# Patient Record
Sex: FEMALE | Race: BLACK OR AFRICAN AMERICAN | Employment: PART TIME | URBAN - METROPOLITAN AREA
[De-identification: names, ages, dates, MRNs, and addresses within clinical notes are randomized per-mention and may not be internally consistent; named-entity substitution may affect disease eponyms.]

---

## 2021-04-26 ENCOUNTER — APPOINTMENT (OUTPATIENT)
Dept: GENERAL RADIOLOGY | Age: 41
End: 2021-04-26
Payer: MEDICAID

## 2021-04-26 ENCOUNTER — HOSPITAL ENCOUNTER (EMERGENCY)
Age: 41
Discharge: HOME | End: 2021-04-26
Attending: EMERGENCY MEDICINE
Payer: MEDICAID

## 2021-04-26 VITALS
TEMPERATURE: 98.1 F | HEART RATE: 74 BPM | OXYGEN SATURATION: 98 % | RESPIRATION RATE: 18 BRPM | DIASTOLIC BLOOD PRESSURE: 87 MMHG | SYSTOLIC BLOOD PRESSURE: 131 MMHG

## 2021-04-26 DIAGNOSIS — U07.1 COVID-19: Primary | ICD-10-CM

## 2021-04-26 DIAGNOSIS — F17.200 CURRENT SMOKER: ICD-10-CM

## 2021-04-26 DIAGNOSIS — D64.9 ANEMIA, UNSPECIFIED TYPE: ICD-10-CM

## 2021-04-26 LAB
A/G RATIO: 1 (ref 1.1–2.2)
ALBUMIN SERPL-MCNC: 4.1 G/DL (ref 3.4–5)
ALP BLD-CCNC: 103 U/L (ref 40–129)
ALT SERPL-CCNC: 12 U/L (ref 10–40)
ANION GAP SERPL CALCULATED.3IONS-SCNC: 14 MMOL/L (ref 3–16)
ANISOCYTOSIS: ABNORMAL
AST SERPL-CCNC: 18 U/L (ref 15–37)
BASOPHILS ABSOLUTE: 0 K/UL (ref 0–0.2)
BASOPHILS RELATIVE PERCENT: 0.7 %
BILIRUB SERPL-MCNC: <0.2 MG/DL (ref 0–1)
BUN BLDV-MCNC: 7 MG/DL (ref 7–20)
CALCIUM SERPL-MCNC: 8.8 MG/DL (ref 8.3–10.6)
CHLORIDE BLD-SCNC: 103 MMOL/L (ref 99–110)
CO2: 22 MMOL/L (ref 21–32)
CREAT SERPL-MCNC: 0.7 MG/DL (ref 0.6–1.1)
EOSINOPHILS ABSOLUTE: 0 K/UL (ref 0–0.6)
EOSINOPHILS RELATIVE PERCENT: 0.9 %
GFR AFRICAN AMERICAN: >60
GFR NON-AFRICAN AMERICAN: >60
GLOBULIN: 4.2 G/DL
GLUCOSE BLD-MCNC: 102 MG/DL (ref 70–99)
HCG QUALITATIVE: NEGATIVE
HCT VFR BLD CALC: 25.9 % (ref 36–48)
HEMATOLOGY PATH CONSULT: YES
HEMOGLOBIN: 7.9 G/DL (ref 12–16)
HYPOCHROMIA: ABNORMAL
IRON SATURATION: 3 % (ref 15–50)
IRON: 13 UG/DL (ref 37–145)
LYMPHOCYTES ABSOLUTE: 1.2 K/UL (ref 1–5.1)
LYMPHOCYTES RELATIVE PERCENT: 34.6 %
MAGNESIUM: 1.8 MG/DL (ref 1.8–2.4)
MCH RBC QN AUTO: 19.1 PG (ref 26–34)
MCHC RBC AUTO-ENTMCNC: 30.4 G/DL (ref 31–36)
MCV RBC AUTO: 62.7 FL (ref 80–100)
MICROCYTES: ABNORMAL
MONOCYTES ABSOLUTE: 0.4 K/UL (ref 0–1.3)
MONOCYTES RELATIVE PERCENT: 10.5 %
NEUTROPHILS ABSOLUTE: 1.9 K/UL (ref 1.7–7.7)
NEUTROPHILS RELATIVE PERCENT: 53.3 %
OVALOCYTES: ABNORMAL
PDW BLD-RTO: 17.8 % (ref 12.4–15.4)
PLATELET # BLD: 270 K/UL (ref 135–450)
PLATELET SLIDE REVIEW: ADEQUATE
PMV BLD AUTO: 8.5 FL (ref 5–10.5)
POLYCHROMASIA: ABNORMAL
POTASSIUM REFLEX MAGNESIUM: 3.2 MMOL/L (ref 3.5–5.1)
RBC # BLD: 4.13 M/UL (ref 4–5.2)
SARS-COV-2, NAAT: DETECTED
SODIUM BLD-SCNC: 139 MMOL/L (ref 136–145)
TARGET CELLS: ABNORMAL
TOTAL IRON BINDING CAPACITY: 402 UG/DL (ref 260–445)
TOTAL PROTEIN: 8.3 G/DL (ref 6.4–8.2)
TROPONIN: <0.01 NG/ML
WBC # BLD: 3.5 K/UL (ref 4–11)

## 2021-04-26 PROCEDURE — 2500000003 HC RX 250 WO HCPCS: Performed by: EMERGENCY MEDICINE

## 2021-04-26 PROCEDURE — 99285 EMERGENCY DEPT VISIT HI MDM: CPT

## 2021-04-26 PROCEDURE — 96375 TX/PRO/DX INJ NEW DRUG ADDON: CPT

## 2021-04-26 PROCEDURE — 2580000003 HC RX 258: Performed by: EMERGENCY MEDICINE

## 2021-04-26 PROCEDURE — 93005 ELECTROCARDIOGRAM TRACING: CPT | Performed by: EMERGENCY MEDICINE

## 2021-04-26 PROCEDURE — 87635 SARS-COV-2 COVID-19 AMP PRB: CPT

## 2021-04-26 PROCEDURE — 83550 IRON BINDING TEST: CPT

## 2021-04-26 PROCEDURE — 96365 THER/PROPH/DIAG IV INF INIT: CPT

## 2021-04-26 PROCEDURE — 6370000000 HC RX 637 (ALT 250 FOR IP): Performed by: EMERGENCY MEDICINE

## 2021-04-26 PROCEDURE — 84703 CHORIONIC GONADOTROPIN ASSAY: CPT

## 2021-04-26 PROCEDURE — 80053 COMPREHEN METABOLIC PANEL: CPT

## 2021-04-26 PROCEDURE — 83540 ASSAY OF IRON: CPT

## 2021-04-26 PROCEDURE — 6360000002 HC RX W HCPCS: Performed by: EMERGENCY MEDICINE

## 2021-04-26 PROCEDURE — 85025 COMPLETE CBC W/AUTO DIFF WBC: CPT

## 2021-04-26 PROCEDURE — 84484 ASSAY OF TROPONIN QUANT: CPT

## 2021-04-26 PROCEDURE — 83735 ASSAY OF MAGNESIUM: CPT

## 2021-04-26 PROCEDURE — 71045 X-RAY EXAM CHEST 1 VIEW: CPT

## 2021-04-26 RX ORDER — ONDANSETRON 2 MG/ML
8 INJECTION INTRAMUSCULAR; INTRAVENOUS ONCE
Status: COMPLETED | OUTPATIENT
Start: 2021-04-26 | End: 2021-04-26

## 2021-04-26 RX ORDER — FERROUS SULFATE 300 MG/5ML
300 LIQUID (ML) ORAL DAILY
Qty: 300 ML | Refills: 3 | Status: SHIPPED | OUTPATIENT
Start: 2021-04-26

## 2021-04-26 RX ORDER — ACETAMINOPHEN 325 MG/1
650 TABLET ORAL ONCE
Status: COMPLETED | OUTPATIENT
Start: 2021-04-26 | End: 2021-04-26

## 2021-04-26 RX ORDER — 0.9 % SODIUM CHLORIDE 0.9 %
1000 INTRAVENOUS SOLUTION INTRAVENOUS ONCE
Status: COMPLETED | OUTPATIENT
Start: 2021-04-26 | End: 2021-04-26

## 2021-04-26 RX ORDER — POTASSIUM CHLORIDE 750 MG/1
40 TABLET, FILM COATED, EXTENDED RELEASE ORAL ONCE
Status: DISCONTINUED | OUTPATIENT
Start: 2021-04-26 | End: 2021-04-26

## 2021-04-26 RX ORDER — KETOROLAC TROMETHAMINE 30 MG/ML
15 INJECTION, SOLUTION INTRAMUSCULAR; INTRAVENOUS ONCE
Status: COMPLETED | OUTPATIENT
Start: 2021-04-26 | End: 2021-04-26

## 2021-04-26 RX ADMIN — ACETAMINOPHEN 650 MG: 325 TABLET ORAL at 21:31

## 2021-04-26 RX ADMIN — IMDEVIMAB: 1332 INJECTION, SOLUTION, CONCENTRATE INTRAVENOUS at 18:31

## 2021-04-26 RX ADMIN — SODIUM CHLORIDE 1000 ML: 9 INJECTION, SOLUTION INTRAVENOUS at 16:27

## 2021-04-26 RX ADMIN — POTASSIUM BICARBONATE 40 MEQ: 782 TABLET, EFFERVESCENT ORAL at 18:48

## 2021-04-26 RX ADMIN — ONDANSETRON 8 MG: 2 INJECTION INTRAMUSCULAR; INTRAVENOUS at 16:13

## 2021-04-26 RX ADMIN — KETOROLAC TROMETHAMINE 15 MG: 30 INJECTION, SOLUTION INTRAMUSCULAR at 16:26

## 2021-04-26 ASSESSMENT — PAIN DESCRIPTION - PAIN TYPE
TYPE: ACUTE PAIN
TYPE: ACUTE PAIN

## 2021-04-26 ASSESSMENT — ENCOUNTER SYMPTOMS
CONSTIPATION: 0
WHEEZING: 0
SHORTNESS OF BREATH: 1
COUGH: 1
EYE PAIN: 0

## 2021-04-26 ASSESSMENT — PAIN SCALES - GENERAL
PAINLEVEL_OUTOF10: 10
PAINLEVEL_OUTOF10: 10
PAINLEVEL_OUTOF10: 7

## 2021-04-26 ASSESSMENT — PAIN DESCRIPTION - PROGRESSION: CLINICAL_PROGRESSION: GRADUALLY WORSENING

## 2021-04-26 ASSESSMENT — PAIN DESCRIPTION - DESCRIPTORS
DESCRIPTORS: BURNING
DESCRIPTORS: SHARP

## 2021-04-26 ASSESSMENT — PAIN DESCRIPTION - LOCATION
LOCATION: HEAD
LOCATION: CHEST

## 2021-04-26 ASSESSMENT — PAIN DESCRIPTION - ORIENTATION: ORIENTATION: MID

## 2021-04-26 ASSESSMENT — PAIN DESCRIPTION - FREQUENCY: FREQUENCY: CONTINUOUS

## 2021-04-26 NOTE — ED PROVIDER NOTES
[address] [facility]      Pt Name: Lorri Kim  MRN: [id number]  [place] 1980  Date of evaluation: 2021  Provider: Regulo Whitten MD    CHIEF COMPLAINT       Chief Complaint   Patient presents with    Shortness of Breath     worsening x1 week. +anxiety. HISTORY OF PRESENT ILLNESS  (Location/Symptom, Timing/Onset,Context/Setting, Quality, Duration, Modifying Factors, Severity). Note limiting factors. Lorri Kim is a 47 y.o. female who presents to the emergency department secondary to concern for SOB that has been intermittent for the last week and a half. She reports she has been coughing (dry cough). Has been taking beatris seltzer cold, tylenol, cough drops, ibuprofen. She states she would feel better after taking it but then the symptoms would come back and she would feel worse. States she has not been eating or drinking well. She has not had the covid vaccines or covid. She works at a [employer] and one of her coworkers had covid a week ago. Past medical history noted below, significant for anxiety and asthma. She currently smokes 2 black and milds/day. Aside from what is stated above denies any other symptoms or modifying factors. Nursing Notes reviewed. REVIEW OF SYSTEMS  (2-9 systems for level 4, 10 or more for level 5)   Review of Systems   Constitutional: Positive for appetite change. Negative for fever. HENT: Negative for congestion. Eyes: Negative for pain. Respiratory: Positive for cough and shortness of breath. Negative for wheezing. Cardiovascular: Negative for leg swelling. Gastrointestinal: Negative for constipation. Musculoskeletal: Negative for gait problem. Skin: Negative for rash.        PAST MEDICAL HISTORY     Past Medical History:   Diagnosis Date    Anxiety     Asthma        SURGICALHISTORY       Past Surgical History:   Procedure Laterality Date     SECTION CURRENT MEDICATIONS       Previous Medications    ALBUTEROL (PROVENTIL HFA;VENTOLIN HFA) 108 (90 BASE) MCG/ACT INHALER    Inhale 2 puffs into the lungs every 6 hours as needed for Wheezing. ALBUTEROL SULFATE HFA (PROVENTIL HFA) 108 (90 BASE) MCG/ACT INHALER    Inhale 2 puffs into the lungs every 4 hours as needed for Wheezing or Shortness of Breath (Space out to every 6 hours as symptoms improve) Space out to every 6 hours as symptoms improve. IBUPROFEN (ADVIL;MOTRIN) 600 MG TABLET    Take 1 tablet by mouth three times daily    SERTRALINE HCL (ZOLOFT PO)    Take by mouth      ALLERGIES     Pcn [penicillins]  FAMILY HISTORY     History reviewed. No pertinent family history.   SOCIAL HISTORY       Social History     Socioeconomic History    Marital status: Single     Spouse name: None    Number of children: None    Years of education: None    Highest education level: None   Occupational History    None   Social Needs    Financial resource strain: None    Food insecurity     Worry: None     Inability: None    Transportation needs     Medical: None     Non-medical: None   Tobacco Use    Smoking status: Current Every Day Smoker     Packs/day: 3.00     Types: Cigars    Smokeless tobacco: Never Used    Tobacco comment: 2 black and milds a day   Substance and Sexual Activity    Alcohol use: No    Drug use: Never    Sexual activity: Not Currently   Lifestyle    Physical activity     Days per week: None     Minutes per session: None    Stress: None   Relationships    Social connections     Talks on phone: None     Gets together: None     Attends Mosque service: None     Active member of club or organization: None     Attends meetings of clubs or organizations: None     Relationship status: None    Intimate partner violence     Fear of current or ex partner: None     Emotionally abused: None     Physically abused: None     Forced sexual activity: None   Other Topics Concern    None   Social History Narrative    None     SCREENINGS         PHYSICAL EXAM  (up to 7 for level 4, 8 or more for level 5)   INITIAL VITALS: BP: 132/86, Temp: 98.1 °F (36.7 °C), Pulse: 99, Resp: 20, SpO2: 99 %   Physical Exam  Vitals signs reviewed. Constitutional:       General: She is not in acute distress. Appearance: She is not ill-appearing or toxic-appearing. HENT:      Head: Normocephalic and atraumatic. Right Ear: External ear normal.      Left Ear: External ear normal.   Eyes:      General: No scleral icterus. Extraocular Movements: Extraocular movements intact. Conjunctiva/sclera: Conjunctivae normal.      Pupils: Pupils are equal, round, and reactive to light. Neck:      Musculoskeletal: Normal range of motion. Trachea: No tracheal deviation. Cardiovascular:      Rate and Rhythm: Tachycardia present. Comments: Intermittently mildly tachycardic with a rate in the 100s  Pulmonary:      Effort: Pulmonary effort is normal. No respiratory distress. Breath sounds: No decreased breath sounds, wheezing or rhonchi. Musculoskeletal:      Right lower leg: She exhibits no tenderness. No edema. Left lower leg: She exhibits no tenderness. No edema. Skin:     General: Skin is warm and dry. Capillary Refill: Capillary refill takes 2 to 3 seconds. Neurological:      General: No focal deficit present. Mental Status: She is alert and oriented to person, place, and time. Psychiatric:         Mood and Affect: Mood normal.         Behavior: Behavior normal.       DIAGNOSTIC RESULTS   EKG: All EKG's are interpreted by the Emergency Department Physician who either signs or Co-signs this chart in the absence of a cardiologist.  Indication: sob  Interpretation: Rate 96, rhythm sinus, axis normal.  OK/QRS/QTc within normal limits. No T/ST changes consistent with acute ischemia. Comparison to prior EKG from March 23, 2016 shows no acute ischemic changes noted.     RADIOLOGY: Interpretation per Radiologist below, if available at the time of this note:  XR CHEST PORTABLE   Final Result   No active cardiopulmonary disease           LABS:  Labs Reviewed   COVID-19, RAPID - Abnormal; Notable for the following components:       Result Value    SARS-CoV-2, NAAT DETECTED (*)     All other components within normal limits    Narrative:     Performed at:  [address] [employer]   Phone [phone]   COMPREHENSIVE METABOLIC PANEL W/ REFLEX TO MG FOR LOW K - Abnormal; Notable for the following components:    Potassium reflex Magnesium 3.2 (*)     Glucose 102 (*)     Total Protein 8.3 (*)     Albumin/Globulin Ratio 1.0 (*)     All other components within normal limits    Narrative:     Performed at:  [address] [employer]   Phone [phone]   CBC WITH AUTO DIFFERENTIAL - Abnormal; Notable for the following components:    WBC 3.5 (*)     Hemoglobin 7.9 (*)     Hematocrit 25.9 (*)     MCV 62.7 (*)     MCH 19.1 (*)     MCHC 30.4 (*)     RDW 17.8 (*)     Anisocytosis Occasional (*)     Microcytes 2+ (*)     Polychromasia Occasional (*)     Hypochromia 1+ (*)     Ovalocytes Occasional (*)     Target Cells 1+ (*)     All other components within normal limits    Narrative:     Performed at:  [address] [employer]   Phone [phone]   IRON AND TIBC - Abnormal; Notable for the following components:    Iron 13 (*)     Iron Saturation 3 (*)     All other components within normal limits    Narrative:     Performed at:  [address] [facility] [employer]   Phone [phone]   TROPONIN    Narrative:     Performed at:  [address] [facility] [employer]   Phone [phone]   HCG, SERUM, QUALITATIVE Narrative:     Performed at:  [facility]  [address] [facility] [address]   Phone [phone]   MAGNESIUM    Narrative:     Performed at:  [facility]  [address] [facility] [address]   Phone [phone]       All other labs were within normal range or not returned as of this dictation. EMERGENCY DEPARTMENT COURSE and DIFFERENTIAL DIAGNOSIS/MDM:   Patient was given the following medications:  Orders Placed This Encounter   Medications    0.9 % sodium chloride bolus    DISCONTD: potassium chloride (KLOR-CON) extended release tablet 40 mEq    ketorolac (TORADOL) injection 15 mg    ondansetron (ZOFRAN) injection 8 mg    ferrous sulfate 300 (60 Fe) MG/5ML syrup     Sig: Take 5 mLs by mouth daily     Dispense:  300 mL     Refill:  3    potassium bicarb-citric acid (EFFER-K) effervescent tablet 40 mEq    casirivimab ([id number]) 1,200 mg, imdevimab ([id number]) 1,200 mg in sodium chloride 0.9 % 270 mL IVPB     Order Specific Question:   Does this patient qualify for COVID-19 antibody therapy based on criteria for treatment? Answer: Yes    acetaminophen (TYLENOL) tablet 650 mg     CONSULTS:  None    INITIAL VITALS: BP: 132/86, Temp: 98.1 °F (36.7 °C), Pulse: 99, Resp: 20, SpO2: 99 %   RECENT VITALS:  BP: 115/70,Temp: 98.1 °F (36.7 °C), Pulse: 73, Resp: 23, SpO2: 97 %     Darrian London is a 47 y.o. female who presents to the emergency department secondary to concern for potential Covid. On arrival she is awake, alert, oriented. Initial vitals notable for mild tachycardia in the low 100s otherwise hemodynamically stable and within normal limits. On exam she does appear dehydrated with a delayed capillary refill, otherwise her abdomen is benign, lungs are relatively clear, no peripheral edema is noted, she is answering questions appropriately and in complete sentences.     A peripheral IV was placed, labs were ordered along with IV fluids, Toradol, Zofran. EKG without signs of acute ischemia, chest x-ray unremarkable. Labs returned notable for potassium 3.2, magnesium 1.8, troponin negative, CBC with a low white count 3.5, platelets within normal limits to 70, hemoglobin 7.9 with a low MCV and elevated RDW. Rapid Covid was positive. Discussed results with patient, she does have a history of anemia and her CBC today shows signs of anemia that is continued. She was previously on iron tablets and these were reordered for her outpatient. Also added on iron and TIBC for her to follow-up with primary care. After receiving IV fluids and medications she was feeling significantly better. Discussed with her being Covid positive and her symptoms she did qualify for casirivimab, went over the risk/benefits and she opted to get this medication. Discussed with pharmacy who ordered. On reassessment prior to discharge her blood pressure had come down from systolic of 272Z to the 1 teens, heart rate in the 70s, she had been p.o. challenge without any issues. She was monitored after completion of medications and we discussed importance of Covid isolation/quarantine. We discussed importance of following up with primary care (for this and her anemia) as well as symptomatic treatment for her symptoms and encouraged her to continue thinking about smoking cessation. FINAL IMPRESSION      1. COVID-19    2. Anemia, unspecified type    3.  Current smoker        DISPOSITION/PLAN   DISPOSITION        PATIENT REFERRED TO:  [facility]    Schedule an appointment as soon as possible for a visit   For follow up appointment      DISCHARGE MEDICATIONS:  New Prescriptions    FERROUS SULFATE 300 (60 FE) MG/5ML SYRUP    Take 5 mLs by mouth daily            (Please note that portions of this note were completed with a voice recognition program. Efforts were made to edit the dictations but occasionally words are mis-transcribed.)    Romain Garcia MD (electronically signed)  Attending Emergency Physician       Romain Garcia MD  04/26/21 2006

## 2021-04-26 NOTE — ED NOTES
Provider notified of pt headache and verbal orders received to order 650 mg of Tylenol PO.       Santhosh Lawson RN  04/26/21 1942

## 2021-04-26 NOTE — ED NOTES
Pt with no complaints and in no distress at this time.       [address] [facility] Nils, RN  04/26/21 5802

## 2021-04-26 NOTE — ED TRIAGE NOTES
Pt gave verbal consent to Dr. Brandon Lomeli to administer IV Regeneron. PT provided with FDA fact sheet on medication prior to administration.

## 2021-04-26 NOTE — ED TRIAGE NOTES
Pt arrived to dept via private vehicle. Pt c/o SOB for over a week with pain in her throat and chest when she breathes and swallows. Pt reports taking tylenol, cough drops and beatris seltzer to no effect. Pt reports loss of appetite as well. Pt awake, alert and oriented x 3. Skin warm and dry/normal color for ethnicity. Resp easy and unlabored. Pt placed in gown and on cardiac monitor. Call light in reach. Will continue to monitor.

## 2021-04-26 NOTE — ED NOTES
Spoke with the pt's Rolly Revels with pt's permission to update on pt condition at this time.       Santhosh Lawson RN  04/26/21 4258

## 2021-04-27 ENCOUNTER — CARE COORDINATION (OUTPATIENT)
Dept: CARE COORDINATION | Age: 41
End: 2021-04-27

## 2021-04-27 LAB
EKG ATRIAL RATE: 96 BPM
EKG DIAGNOSIS: NORMAL
EKG P AXIS: 28 DEGREES
EKG P-R INTERVAL: 166 MS
EKG Q-T INTERVAL: 364 MS
EKG QRS DURATION: 90 MS
EKG QTC CALCULATION (BAZETT): 459 MS
EKG R AXIS: 15 DEGREES
EKG T AXIS: 34 DEGREES
EKG VENTRICULAR RATE: 96 BPM
HEMATOLOGY PATH CONSULT: NORMAL

## 2021-04-27 PROCEDURE — 93010 ELECTROCARDIOGRAM REPORT: CPT | Performed by: INTERNAL MEDICINE

## 2021-04-27 NOTE — ED NOTES
Discharge and education instructions reviewed. Patient verbalized understanding, teach-back successful. Patient denied questions at this time. No acute distress noted. Patient instructed to follow-up as noted - return to emergency department if symptoms worsen. Patient verbalized understanding. Discharged per EDMD with discharge instructions.           Monica Singh RN  04/26/21 7967

## 2021-04-28 ENCOUNTER — CARE COORDINATION (OUTPATIENT)
Dept: CARE COORDINATION | Age: 41
End: 2021-04-28

## 2021-04-28 NOTE — CARE COORDINATION
Patient contacted regarding Monica Salgado. Discussed COVID-19 related testing which was available at this time. Test results were positive. Patient informed of results, if available? Yes    Ambulatory Care Manager contacted the patient by telephone to perform post discharge assessment. Call within 2 business days of discharge: Yes. Verified name and  with patient as identifiers. Provided introduction to self, and explanation of the CTN/ACM role, and reason for call due to risk factors for infection and/or exposure to COVID-19. Symptoms reviewed with patient who verbalized the following symptoms: cough, shortness of breath, no new symptoms, no worsening symptoms and sore throat, anorexia. Due to no new or worsening symptoms encounter was not routed to provider for escalation. Discussed follow-up appointments. If no appointment was previously scheduled, appointment scheduling offered: Yes  [facility] follow up appointment(s): No future appointments. Non-[facility] follow up appointment(s): [address] [place] [address] scheduled for FU    Non-face-to-face services provided:  Obtained and reviewed discharge summary and/or continuity of care documents     Advance Care Planning:   Does patient have an Advance Directive:  reviewed and current. Patient has following risk factors of: anxiety and anemia. ACM reviewed discharge instructions, medical action plan and red flags such as increased shortness of breath, increasing fever and signs of decompensation with patient who verbalized understanding. Discussed exposure protocols and quarantine with CDC Guidelines What to do if you are sick with coronavirus disease 2019.  Patient was given an opportunity for questions and concerns. The patient agrees to contact the [facility] exposure line [phone], local [facility] department [place] Department [facility] Health: ([phone]) and PCP office for questions related to their healthcare.  ACM provided contact information for future needs.    Reviewed and educated patient on any new and changed medications related to discharge diagnosis     Was patient discharged with a pulse oximeter? No Discussed and confirmed pulse oximeter discharge instructions and when to notify provider or seek emergency care. Patient/family/caregiver given information for [employer] and agrees to enroll yes  Patient's preferred e-mail: [email or web address]   Patient's preferred phone number: [phone]  Based on Loop alert triggers, patient will be contacted by nurse care manager for worsening symptoms. Pt will be further monitored by COVID Loop Team based on severity of symptoms and risk factors. Patient states she is feeling better except for \"burning in chest\". She was unable to obtain ferrous sulfate due to insurance denying it. She said it was because medication was liquid form. She was going to try again with pill form. Offered [facility] and patient accepted. Plan  Enroll into [address] [place] [address].  Adriane Young RN, BSN, [address] [place] [address] Primary Care  [phone]

## 2021-05-19 ENCOUNTER — HOSPITAL ENCOUNTER (EMERGENCY)
Age: 41
Discharge: LEFT BEFORE DISCHARGE | End: 2021-05-19
Payer: MEDICAID

## 2021-05-19 VITALS
HEART RATE: 80 BPM | HEIGHT: 64 IN | TEMPERATURE: 98.9 F | OXYGEN SATURATION: 100 % | DIASTOLIC BLOOD PRESSURE: 58 MMHG | WEIGHT: 231.26 LBS | RESPIRATION RATE: 16 BRPM | BODY MASS INDEX: 39.48 KG/M2 | SYSTOLIC BLOOD PRESSURE: 119 MMHG

## 2021-05-19 DIAGNOSIS — R06.02 SHORTNESS OF BREATH: ICD-10-CM

## 2021-05-19 DIAGNOSIS — R55 PRE-SYNCOPE: Primary | ICD-10-CM

## 2021-05-19 DIAGNOSIS — Z53.29 LEFT AGAINST MEDICAL ADVICE: ICD-10-CM

## 2021-05-19 PROCEDURE — 99284 EMERGENCY DEPT VISIT MOD MDM: CPT

## 2021-05-19 NOTE — Clinical Note
The patient has decided to leave against medical advice, because symptoms have completely resolved, she feels that her symptoms are related to her anxiety/panic attacks, and she needs to get home because her children all live out of the house. She  has normal mental status and adequate capacity to make medical decisions. The patient refuses hospital admission and wants to be discharged. The risks have been explained to the patient, including worsening illness, chronic pain, permanent dis ability, and death. The benefits of admission have also been explained, including the availability and proximity of nurses, physicians, monitoring, diagnostic testing, and treatment. The patient was able to understand and state the risks and be nefits of hospital admission. This was witnessed by nurse Kay Lafleur and me. She had the opportunity to ask questions about her medical condition. The patient was treated to the extent that she would allow, and knows that she may return for c are at any time. Patient will follow up with her PCP at [facility] the [facility] and return to the emergency department for new or worsening symptoms including, but not limited to developing chest pain accompanied by nausea, vomiting, dizzin ess, shortness of breath, passing out, feelings of being in a pass out, sweats, leg/calf pain or swelling, or other concerns.

## 2021-05-19 NOTE — ED NOTES
Awaiting MLP to see pt. Pt continues to request a breathing tx. Pt does not appear to be in any distress @ this time.       Boni Rasheed RN  05/19/21 6808

## 2021-05-19 NOTE — LETTER
[facility]  [place] [facility] [address]  Phone: [phone]    Patient: Megan Mora  YOB: 1980  Date: 5/19/2021 Time: 7:12 PM    Leaving the Hospital Against Medical Advice    Chart #:[id number]    This will certify that I, the undersigned,    ______________________________________________________________________    A patient in the above named medical center, having requested discharge and removal from the medical center against the advice of my attending physician(s), hereby release the Emergency Department, its physicians, officers and employees, severally and individually, from any and all liability of any nature whatsoever for any injury or harm or complication of any kind that may result directly or indirectly, by reason of my terminating my stay as a patient from [facility], and hereby waive any and all rights of action I may now have or later acquire as a result of my voluntary departure from [facility] and the termination of my stay as a patient therein. This release is made with the full knowledge of the danger that may result from the action which I am taking.       Date:_______________________                         ___________________________                                                                                    Patient/Legal Representative    Witness:        ____________________________                          ___________________________  Nurse                                                                        Physician

## 2021-05-21 NOTE — ED PROVIDER NOTES
[facility] [address] Emergency Department      CHIEF COMPLAINT  Panic Attack (reports just getting off work and feels like she can't catch her breath/ having a panic attack maybe she needs breathing treatment)      SHARED SERVICE VISIT:  Evaluated by DEEPAK. My supervising physician was available for consultation. HISTORY OF PRESENT ILLNESS  Durga Aguiar is a 47 y.o. female with a history of anxiety and panic attacks who presents to the ED complaining of acute onset of presyncope, shortness of breath, numbness or tingling in her bilateral arms and fingers which onset approximately 45 minutes PTA. By the time I arrived in the patient's room to evaluate she states that she would like to be discharged as her symptoms have completely resolved. Upon further questioning the patient discloses that she was diagnosed with COVID-19 approximately 2 weeks ago. She denies chest pain, nausea, vomiting, dizziness, fever, chills, sweats, cough, hemoptysis, leg/calf pain or swelling, or other concerns. No other complaints, modifying factors or associated symptoms. Nursing notes reviewed. Past Medical History:   Diagnosis Date    Anxiety     Asthma      Past Surgical History:   Procedure Laterality Date     SECTION       History reviewed. No pertinent family history.   Social History     Socioeconomic History    Marital status: Single     Spouse name: Not on file    Number of children: Not on file    Years of education: Not on file    Highest education level: Not on file   Occupational History    Not on file   Tobacco Use    Smoking status: Current Every Day Smoker     Packs/day: 3.00     Types: Cigars    Smokeless tobacco: Never Used    Tobacco comment: 2 black and milds a day   Vaping Use    Vaping Use: Never used   Substance and Sexual Activity    Alcohol use: No    Drug use: Never    Sexual activity: Not Currently   Other Topics Concern    Not on file   Social History

## 2021-05-25 ENCOUNTER — HOSPITAL ENCOUNTER (EMERGENCY)
Age: 41
Discharge: HOME | End: 2021-05-25
Attending: EMERGENCY MEDICINE
Payer: MEDICAID

## 2021-05-25 ENCOUNTER — APPOINTMENT (OUTPATIENT)
Dept: GENERAL RADIOLOGY | Age: 41
End: 2021-05-25
Payer: MEDICAID

## 2021-05-25 VITALS
BODY MASS INDEX: 39.48 KG/M2 | WEIGHT: 231.26 LBS | RESPIRATION RATE: 18 BRPM | HEIGHT: 64 IN | OXYGEN SATURATION: 98 % | TEMPERATURE: 99 F | SYSTOLIC BLOOD PRESSURE: 143 MMHG | HEART RATE: 84 BPM | DIASTOLIC BLOOD PRESSURE: 79 MMHG

## 2021-05-25 DIAGNOSIS — R06.00 DYSPNEA AND RESPIRATORY ABNORMALITIES: Primary | ICD-10-CM

## 2021-05-25 DIAGNOSIS — R06.89 DYSPNEA AND RESPIRATORY ABNORMALITIES: Primary | ICD-10-CM

## 2021-05-25 PROCEDURE — 99283 EMERGENCY DEPT VISIT LOW MDM: CPT

## 2021-05-25 PROCEDURE — 71046 X-RAY EXAM CHEST 2 VIEWS: CPT

## 2021-05-25 PROCEDURE — 93005 ELECTROCARDIOGRAM TRACING: CPT | Performed by: EMERGENCY MEDICINE

## 2021-05-25 PROCEDURE — 6370000000 HC RX 637 (ALT 250 FOR IP): Performed by: EMERGENCY MEDICINE

## 2021-05-25 RX ORDER — CETIRIZINE HYDROCHLORIDE 10 MG/1
10 TABLET ORAL DAILY
Qty: 30 TABLET | Refills: 0 | Status: SHIPPED | OUTPATIENT
Start: 2021-05-25

## 2021-05-25 RX ORDER — HYDROXYZINE HYDROCHLORIDE 25 MG/1
25 TABLET, FILM COATED ORAL 3 TIMES DAILY PRN
COMMUNITY
Start: 2021-05-25

## 2021-05-25 RX ORDER — HYDROXYZINE PAMOATE 25 MG/1
25 CAPSULE ORAL ONCE
Status: DISCONTINUED | OUTPATIENT
Start: 2021-05-25 | End: 2021-05-25

## 2021-05-25 RX ORDER — PREDNISONE 20 MG/1
60 TABLET ORAL ONCE
Status: COMPLETED | OUTPATIENT
Start: 2021-05-25 | End: 2021-05-25

## 2021-05-25 RX ORDER — PREDNISONE 10 MG/1
TABLET ORAL
Qty: 30 TABLET | Refills: 0 | Status: SHIPPED | OUTPATIENT
Start: 2021-05-25 | End: 2021-06-04

## 2021-05-25 RX ADMIN — PREDNISONE 60 MG: 20 TABLET ORAL at 21:20

## 2021-05-26 LAB
EKG ATRIAL RATE: 67 BPM
EKG DIAGNOSIS: NORMAL
EKG P AXIS: 10 DEGREES
EKG P-R INTERVAL: 158 MS
EKG Q-T INTERVAL: 392 MS
EKG QRS DURATION: 82 MS
EKG QTC CALCULATION (BAZETT): 414 MS
EKG R AXIS: 18 DEGREES
EKG T AXIS: 18 DEGREES
EKG VENTRICULAR RATE: 67 BPM

## 2021-05-26 PROCEDURE — 93010 ELECTROCARDIOGRAM REPORT: CPT | Performed by: INTERNAL MEDICINE

## 2021-05-29 ASSESSMENT — ENCOUNTER SYMPTOMS
SHORTNESS OF BREATH: 1
PHOTOPHOBIA: 0
ABDOMINAL PAIN: 0
CHEST TIGHTNESS: 1
NAUSEA: 0
COLOR CHANGE: 0
COUGH: 0
VOMITING: 0
WHEEZING: 0
TROUBLE SWALLOWING: 0

## 2021-05-29 NOTE — ED PROVIDER NOTES
[address] [facility]  EMERGENCY DEPARTMENTENCOUNTER      Pt Name: Megan Mora  MRN: [id number]  [place] 1980  Date ofevaluation: 5/25/2021  Provider: Jose Alejandro Medina MD    CHIEF COMPLAINT       Chief Complaint   Patient presents with    Shortness of Breath     pt states she was just here for the same thing and does not feel better. Pt had Covid at the end of April. Pt just left her Dr office for her anxiety medication and took some medication but still feels like she is SOB. HISTORY OF PRESENT ILLNESS   (Location/Symptom, Timing/Onset,Context/Setting, Quality, Duration, Modifying Factors, Severity)  Note limiting factors. Megan Mora is a 47 y.o. female  who  has a past medical history of Anxiety and Asthma. who presents to the emergency department for evaluation of shortness of breath. Patient reports to having intermittent episodes of shortness of breath which she states is typically when she is exerting herself. She reports the symptoms have been persistent since she was diagnosed with Covid in April. She has been seen in the emergency department is recently seen by her PCP today. She reports that she does thinks it is anxiety related. She does report having increased rest in her personal life. She denies fevers chest pains abdominal pain nausea or vomiting. Denies contact with sick persons. HPI    NursingNotes were reviewed. REVIEW OF SYSTEMS    (2-9 systems for level 4, 10 or more for level 5)     Review of Systems   Constitutional: Negative for activity change, fatigue and fever. HENT: Negative for congestion, mouth sores and trouble swallowing. Eyes: Negative for photophobia and visual disturbance. Respiratory: Positive for chest tightness and shortness of breath. Negative for cough and wheezing. Cardiovascular: Negative for chest pain and palpitations. Gastrointestinal: Negative for abdominal pain, nausea and vomiting.    Genitourinary: Negative for difficulty urinating and frequency. Musculoskeletal: Negative for gait problem and neck pain. Skin: Negative for color change and rash. Neurological: Negative for dizziness, light-headedness and headaches. Psychiatric/Behavioral: Negative for confusion. The patient is not nervous/anxious. All other systems reviewed and are negative. Except as noted above the remainder of the review of systems was reviewed and negative. PAST MEDICAL HISTORY     Past Medical History:   Diagnosis Date    Anxiety     Asthma          SURGICALHISTORY       Past Surgical History:   Procedure Laterality Date     SECTION           CURRENT MEDICATIONS       Discharge Medication List as of 2021  9:29 PM      CONTINUE these medications which have NOT CHANGED    Details   hydrOXYzine (ATARAX) 25 MG tablet Take 25 mg by mouth 3 times daily as neededHistorical Med      ferrous sulfate 300 (60 Fe) MG/5ML syrup Take 5 mLs by mouth daily, Disp-300 mL, R-3Normal      Sertraline HCl (ZOLOFT PO) Take by mouth      !! albuterol sulfate HFA (PROVENTIL HFA) 108 (90 BASE) MCG/ACT inhaler Inhale 2 puffs into the lungs every 4 hours as needed for Wheezing or Shortness of Breath (Space out to every 6 hours as symptoms improve) Space out to every 6 hours as symptoms improve., Disp-1 Inhaler, R-0      ibuprofen (ADVIL;MOTRIN) 600 MG tablet Take 1 tablet by mouth three times daily, Disp-15 tablet, R-0      !! albuterol (PROVENTIL HFA;VENTOLIN HFA) 108 (90 BASE) MCG/ACT inhaler Inhale 2 puffs into the lungs every 6 hours as needed for Wheezing. !! - Potential duplicate medications found. Please discuss with provider. Pcn [penicillins]    FAMILY HISTORY     History reviewed. No pertinent family history.        SOCIAL HISTORY       Social History     Socioeconomic History    Marital status: Single     Spouse name: None    Number of children: None    Years of education: None    Highest education level: None Occupational History    None   Tobacco Use    Smoking status: Current Every Day Smoker     Packs/day: 3.00     Types: Cigars    Smokeless tobacco: Never Used    Tobacco comment: 2 black and milds a day   Vaping Use    Vaping Use: Never used   Substance and Sexual Activity    Alcohol use: No    Drug use: Never    Sexual activity: Not Currently   Other Topics Concern    None   Social History Narrative    None     Social Determinants of Health     Financial Resource Strain:     Difficulty of Paying Living Expenses:    Food Insecurity:     Worried About Running Out of Food in the Last Year:     Ran Out of Food in the Last Year:    Transportation Needs:     Lack of Transportation (Medical):  Lack of Transportation (Non-Medical):    Physical Activity:     Days of Exercise per Week:     Minutes of Exercise per Session:    Stress:     Feeling of Stress :    Social Connections:     Frequency of Communication with Friends and Family:     Frequency of Social Gatherings with Friends and Family:     Attends Hinduism Services:     Active Member of Clubs or Organizations:     Attends Club or Organization Meetings:     Marital Status:    Intimate Partner Violence:     Fear of Current or Ex-Partner:     Emotionally Abused:     Physically Abused:     Sexually Abused:        SCREENINGS             PHYSICAL EXAM    (up to 7 for level 4, 8 or more for level 5)     ED Triage Vitals [05/25/21 1926]   BP Temp Temp Source Pulse Resp SpO2 Height Weight   (!) 143/79 99 °F (37.2 °C) Oral 84 18 98 % 5' 4\" (1.626 m) 231 lb 4.2 oz (104.9 kg)       Physical Exam  Vitals and nursing note reviewed. Constitutional:       Appearance: She is well-developed. HENT:      Head: Normocephalic and atraumatic. Eyes:      Conjunctiva/sclera: Conjunctivae normal.      Pupils: Pupils are equal, round, and reactive to light. Neck:      Trachea: No tracheal deviation.    Cardiovascular:      Rate and Rhythm: Normal rate and 4.2 oz (104.9 kg)   Height: 5' 4\" (1.626 m)       Patient was given thefollowing medications:  Medications   predniSONE (DELTASONE) tablet 60 mg (60 mg Oral Given 5/25/21 2120)       ED COURSE & MEDICAL DECISION MAKING    Pertinent Labs & Imaging studies reviewed. (See chart for details)   -  Patient seen and evaluated in the emergency department. -  Triage and nursing notes reviewed and incorporated. -  Old chart records reviewed and incorporated. -  Differential diagnosis includes: Differential Diagnosis: Acute Coronary Syndrome, Congestive Heart Failure, Myocardial Infarction, Pulmonary Embolus, Pneumonia, Pneumothorax, other    -  Work-up included:  See above  -  ED treatment included: See above  -  Results discussed with patient. Patient presents ED for evaluation of intermittent episodes of dyspnea particular with exertion. Denies chest pains or infectious symptoms states that her symptoms have been present since. Being diagnosed with Covid. She reports that she was admitted to the hospital for short amount of time. imaging studies show increased perihilar markings which could be a sequelae from Covid infection. She has a normal respiratory effort and oxygen sat. Patient feels improved on reevaluation. Symptomatic treatment with expectant management discussed with the patient and they and/or family memebers present are amenable to treatment plan and outpatient follow-up. Strict return precautions were discussed with the patient and those present. They demonstrated understanding of when to return to the emergency department for new or worsening symptoms. .  The patient is agreeable with plan of care and disposition. REASSESSMENT          CRITICAL CARE TIME   Total Critical Care time was 0 minutes, excluding separatelyreportable procedures. There was a high probability ofclinically significant/life threatening deterioration in the patient's condition which required my urgent intervention. CONSULTS:  None    PROCEDURES:  Unless otherwise noted below, none     Procedures    FINAL IMPRESSION      1.  Dyspnea and respiratory abnormalities          DISPOSITION/PLAN   DISPOSITION Decision To Discharge 05/25/2021 09:22:35 PM      PATIENT REF[facility]EDTO:  [facility]    Schedule an appointment as soon as possible for a visit   As needed      DISCHARGEMEDICATIONS:  Discharge Medication List as of 5/25/2021  9:29 PM      START taking these medications    Details   predniSONE (DELTASONE) 10 MG tablet 5 tabs po qam for 2 days then 4,3,2,1 tabs qam for 2 days each total of 10 days, Disp-30 tablet, R-0Normal      cetirizine (ZYRTEC) 10 MG tablet Take 1 tablet by mouth daily, Disp-30 tablet, R-0Normal                (Please note that portions of this note were completed with a voice recognition program.  Efforts were made to edit the dictations but occasionally words are mis-transcribed.)    Rakesh Esparza MD (electronically signed)  Attending Emergency Physician          Rakesh Esparza MD  05/29/21 7853

## 2021-12-05 ENCOUNTER — HOSPITAL ENCOUNTER (EMERGENCY)
Age: 41
Discharge: HOME | End: 2021-12-05
Attending: EMERGENCY MEDICINE
Payer: MEDICAID

## 2021-12-05 VITALS
RESPIRATION RATE: 14 BRPM | DIASTOLIC BLOOD PRESSURE: 85 MMHG | HEIGHT: 64 IN | OXYGEN SATURATION: 100 % | TEMPERATURE: 98.4 F | HEART RATE: 80 BPM | WEIGHT: 240.3 LBS | SYSTOLIC BLOOD PRESSURE: 129 MMHG | BODY MASS INDEX: 41.03 KG/M2

## 2021-12-05 DIAGNOSIS — B96.89 BACTERIAL VAGINOSIS: ICD-10-CM

## 2021-12-05 DIAGNOSIS — Z76.89 ENCOUNTER FOR ASSESSMENT OF STD EXPOSURE: Primary | ICD-10-CM

## 2021-12-05 DIAGNOSIS — N76.0 BACTERIAL VAGINOSIS: ICD-10-CM

## 2021-12-05 LAB
AMORPHOUS: ABNORMAL /HPF
BACTERIA WET PREP: ABNORMAL
BACTERIA: ABNORMAL /HPF
BILIRUBIN URINE: NEGATIVE
BLOOD, URINE: NEGATIVE
CLARITY: ABNORMAL
CLUE CELLS: ABNORMAL
COLOR: YELLOW
EPITHELIAL CELLS WET PREP: ABNORMAL
EPITHELIAL CELLS, UA: ABNORMAL /HPF (ref 0–5)
GLUCOSE URINE: NEGATIVE MG/DL
HCG(URINE) PREGNANCY TEST: NEGATIVE
KETONES, URINE: NEGATIVE MG/DL
LEUKOCYTE ESTERASE, URINE: NEGATIVE
MICROSCOPIC EXAMINATION: YES
NITRITE, URINE: NEGATIVE
PH UA: 6.5 (ref 5–8)
PROTEIN UA: NEGATIVE MG/DL
RBC UA: ABNORMAL /HPF (ref 0–4)
RBC WET PREP: ABNORMAL
SOURCE WET PREP: ABNORMAL
SPECIFIC GRAVITY UA: 1.02 (ref 1–1.03)
TRICHOMONAS PREP: ABNORMAL
URINE REFLEX TO CULTURE: ABNORMAL
URINE TYPE: ABNORMAL
UROBILINOGEN, URINE: 0.2 E.U./DL
WBC UA: ABNORMAL /HPF (ref 0–5)
WBC WET PREP: ABNORMAL
YEAST WET PREP: ABNORMAL

## 2021-12-05 PROCEDURE — 96372 THER/PROPH/DIAG INJ SC/IM: CPT

## 2021-12-05 PROCEDURE — 87491 CHLMYD TRACH DNA AMP PROBE: CPT

## 2021-12-05 PROCEDURE — 81001 URINALYSIS AUTO W/SCOPE: CPT

## 2021-12-05 PROCEDURE — 84703 CHORIONIC GONADOTROPIN ASSAY: CPT

## 2021-12-05 PROCEDURE — 87210 SMEAR WET MOUNT SALINE/INK: CPT

## 2021-12-05 PROCEDURE — 87591 N.GONORRHOEAE DNA AMP PROB: CPT

## 2021-12-05 PROCEDURE — 6360000002 HC RX W HCPCS: Performed by: EMERGENCY MEDICINE

## 2021-12-05 PROCEDURE — 99283 EMERGENCY DEPT VISIT LOW MDM: CPT

## 2021-12-05 RX ORDER — FLUCONAZOLE 150 MG/1
150 TABLET ORAL ONCE
Qty: 1 TABLET | Refills: 0 | Status: SHIPPED | OUTPATIENT
Start: 2021-12-05 | End: 2021-12-05

## 2021-12-05 RX ORDER — METRONIDAZOLE 500 MG/1
500 TABLET ORAL 2 TIMES DAILY
Qty: 14 TABLET | Refills: 0 | Status: SHIPPED | OUTPATIENT
Start: 2021-12-05 | End: 2021-12-12

## 2021-12-05 RX ORDER — METRONIDAZOLE 500 MG/1
2000 TABLET ORAL ONCE
Qty: 4 TABLET | Refills: 0 | Status: SHIPPED | OUTPATIENT
Start: 2021-12-05 | End: 2021-12-05

## 2021-12-05 RX ORDER — DOXYCYCLINE HYCLATE 100 MG
100 TABLET ORAL 2 TIMES DAILY
Qty: 20 TABLET | Refills: 0 | Status: SHIPPED | OUTPATIENT
Start: 2021-12-05 | End: 2021-12-15

## 2021-12-05 RX ORDER — CEFTRIAXONE 500 MG/1
500 INJECTION, POWDER, FOR SOLUTION INTRAMUSCULAR; INTRAVENOUS ONCE
Status: COMPLETED | OUTPATIENT
Start: 2021-12-05 | End: 2021-12-05

## 2021-12-05 RX ADMIN — CEFTRIAXONE SODIUM 500 MG: 500 INJECTION, POWDER, FOR SOLUTION INTRAMUSCULAR; INTRAVENOUS at 11:16

## 2021-12-05 NOTE — Clinical Note
Jac Little was seen and treated in our emergency department on 12/5/2021. She may return to work on 12/06/2021. If you have any questions or concerns, please don't hesitate to call.       Reshma Godoy, DO

## 2021-12-05 NOTE — ED PROVIDER NOTES
[address] ENCOUNTER      Pt Name: Gilmer Gaucher  MRN: [id number]  [place] 1980  Date of evaluation: 2021  Provider: Vikram Walsh DO    CHIEF COMPLAINT       Chief Complaint   Patient presents with    Exposure to STD     friend told her he has trich         HISTORY OF PRESENT ILLNESS   (Location/Symptom, Timing/Onset, Context/Setting, Quality, Duration, Modifying Factors, Severity)  Note limiting factors. Gilmer Gaucher is a 46 y.o. female who presents to the emergency department with a complaint of a possible STD. The patient states that she has had some itching and some white vaginal discharge over the last few days. She was notified by her sexual partner that he may have trichomonas. She denies any vaginal bleeding. She is not pregnant. She denies any back pain flank pain or abdominal pain. No nausea vomiting or diarrhea. She denies any dysuria hematuria frequency urgency. She denies any rash or lesions. No chest pain or shortness of breath. No cough or cold symptoms. Nursing Notes were reviewed. HPI        REVIEW OF SYSTEMS    (2-9 systems for level 4, 10 or more for level 5)       Constitutional: Negative for fever or chills. Respiratory: Negative for shortness of breath or dyspnea on exertion. Cardiovascular: Negative for chest pain. Gastrointestinal: Negative for abdominal pain. Negative for vomiting or diarrhea. Genitourinary: Negative for flank pain. Negative for dysuria. Negative for hematuria. Neurological: Negative for headache. All systems are reviewed and are negative except for those listed above in the history of present illness and ROS.         PAST MEDICAL HISTORY     Past Medical History:   Diagnosis Date    Anxiety     Asthma          SURGICAL HISTORY       Past Surgical History:   Procedure Laterality Date     SECTION           CURRENT MEDICATIONS       Previous Medications    ALBUTEROL (PROVENTIL HFA;VENTOLIN HFA) 108 (90 BASE) MCG/ACT INHALER    Inhale 2 puffs into the lungs every 6 hours as needed for Wheezing. ALBUTEROL SULFATE HFA (PROVENTIL HFA) 108 (90 BASE) MCG/ACT INHALER    Inhale 2 puffs into the lungs every 4 hours as needed for Wheezing or Shortness of Breath (Space out to every 6 hours as symptoms improve) Space out to every 6 hours as symptoms improve. CETIRIZINE (ZYRTEC) 10 MG TABLET    Take 1 tablet by mouth daily    FERROUS SULFATE 300 (60 FE) MG/5ML SYRUP    Take 5 mLs by mouth daily    HYDROXYZINE (ATARAX) 25 MG TABLET    Take 25 mg by mouth 3 times daily as needed    IBUPROFEN (ADVIL;MOTRIN) 600 MG TABLET    Take 1 tablet by mouth three times daily    SERTRALINE HCL (ZOLOFT PO)    Take by mouth       ALLERGIES     Pcn [penicillins]    FAMILY HISTORY     History reviewed. No pertinent family history. SOCIAL HISTORY       Social History     Socioeconomic History    Marital status: Single     Spouse name: None    Number of children: None    Years of education: None    Highest education level: None   Occupational History    None   Tobacco Use    Smoking status: Current Every Day Smoker     Packs/day: 3.00     Types: Cigars    Smokeless tobacco: Never Used    Tobacco comment: 2 black and milds a day   Vaping Use    Vaping Use: Never used   Substance and Sexual Activity    Alcohol use: No    Drug use: Never    Sexual activity: Not Currently   Other Topics Concern    None   Social History Narrative    None     Social Determinants of Health     Financial Resource Strain:     Difficulty of Paying Living Expenses: Not on file   Food Insecurity:     Worried About Running Out of Food in the Last Year: Not on file    Jimmy of Food in the Last Year: Not on file   Transportation Needs:     Lack of Transportation (Medical): Not on file    Lack of Transportation (Non-Medical):  Not on file   Physical Activity:     Days of Exercise per Week: Not on file    Minutes of Exercise per Session: Not on file   Stress:     Feeling of Stress : Not on file   Social Connections:     Frequency of Communication with Friends and Family: Not on file    Frequency of Social Gatherings with Friends and Family: Not on file    Attends Anabaptism Services: Not on file    Active Member of [address] [place] [address] [employer] or Organizations: Not on file    Attends Club or Organization Meetings: Not on file    Marital Status: Not on file   Intimate Partner Violence:     Fear of Current or Ex-Partner: Not on file    Emotionally Abused: Not on file    Physically Abused: Not on file    Sexually Abused: Not on file   Housing Stability:     Unable to Pay for Housing in the Last Year: Not on file    Number of [place] in the Last Year: Not on file    Unstable Housing in the Last Year: Not on file       SCREENINGS             PHYSICAL EXAM    (up to 7 for level 4, 8 or more for level 5)     ED Triage Vitals   BP Temp Temp src Pulse Resp SpO2 Height Weight   -- -- -- -- -- -- -- --         Physical Exam   Constitutional: Awake and alert. Very pleasant. Appears comfortable. Not ill-appearing. Head: No visible evidence of trauma. Normocephalic. Eyes: Pupils equal and reactive. No photophobia. Conjunctiva normal.    HENT: Oral mucosa moist.  Airway patent. Pharynx without erythema. Nares were clear. Neck:  Soft and supple. Nontender. Heart:  Regular rate and rhythm. Lungs:  No conversational dyspnea or accessory muscle use. Abdomen:  Soft, nondistended, bowel sounds present. Nontender. No guarding rigidity or rebound. No masses. CVA tenderness. Musculoskeletal: Extremities non-tender with full range of motion. Neurological: Alert and oriented x 3. Speech clear. No acute focal motor or sensory deficits. Skin: Skin is warm and dry. No rash. Psychiatric: Normal mood and affect.  Behavior is normal.         DIAGNOSTIC RESULTS     EKG: All EKG's are interpreted by the Emergency Department Physician who either signs or Co-signs this chart in the absence of a cardiologist.        RADIOLOGY:   Non-plain film images such as CT, Ultrasound and MRI are read by the radiologist. Plain radiographic images are visualized and preliminarily interpreted by the emergency physician with the below findings:        Interpretation per the Radiologist below, if available at the time of this note:    No orders to display         ED BEDSIDE ULTRASOUND:   Performed by ED Physician - none    LABS:  Labs Reviewed   WET PREP, GENITAL - Abnormal; Notable for the following components:       Result Value    Clue Cells, Wet Prep <1+ (*)     All other components within normal limits    Narrative:     Performed at:  [facility]  [address] [place], [facility]   Phone [phone]   URINE RT REFLEX TO CULTURE - Abnormal; Notable for the following components:    Clarity, UA CLOUDY (*)     All other components within normal limits    Narrative:     Performed at:  [facility]  [address] [place], [facility]   Phone [phone]   MICROSCOPIC URINALYSIS - Abnormal; Notable for the following components:    Epithelial Cells, UA 6-10 (*)     Bacteria, UA 1+ (*)     All other components within normal limits    Narrative:     Performed at:  [facility]  [address] [place], [place]   Phone [phone] DNA   PREGNANCY, URINE    Narrative:     Performed at:  [facility]  [address] [place], [facility]   Phone [phone]       All other labs were within normal range or not returned as of this dictation.     EMERGENCY DEPARTMENT COURSE and DIFFERENTIAL DIAGNOSIS/MDM:   Vitals:    Vitals:    12/05/21 1020   BP: (!) 142/80   Pulse: 80   Resp: 14   Temp: 98.4 °F (36.9 °C)   TempSrc: Oral   SpO2: 100%   Weight: 240 lb 4.8 oz (109 kg)   Height: 5' 4\" (1.626 m)         MDM      The patient presents with possible STD exposure with some vaginal itching and white discharge. Given the fact that she may have been exposed to trichomonas she will be treated with Flagyl. However, wet prep is positive for bacterial vaginosis. She therefore she will be treated with Flagyl 5 mg twice daily for 7 days. She will also be treated with Diflucan 150 mg for 1 dose. She wishes STD prophylaxis and was given Rocephin and a prescription for doxycycline. Cultures are pending. Self swab was completed. REASSESSMENT          Follow-up with a primary care physician in 1 to 2 days for reexamination. Drink plenty of fluids. If condition worsens or new symptoms develop, return immediately to the emergency department. Avoid intimate or sexual contact for at least 3 weeks after treatment and until you know that your cultures are negative. Follow-up with your primary care physician in 2 to 3 days for culture results. Notify any current or recent sexual partners that they may need to be treated for possible sexually transmitted infection. CRITICAL CARE TIME   Total Critical Care time was 0 minutes, excluding separately reportable procedures. There was a high probability of clinically significant/life threatening deterioration in the patient's condition which required my urgent intervention. CONSULTS:  None    PROCEDURES:  Unless otherwise noted below, none     Procedures        FINAL IMPRESSION      1. Encounter for assessment of STD exposure    2.  Bacterial vaginosis          DISPOSITION/PLAN   DISPOSITION        PATIENT REFERRED TO:  [address] [place] [address]    Call today        DISCHARGE MEDICATIONS:  New Prescriptions    DOXYCYCLINE HYCLATE (VIBRA-TABS) 100 MG TABLET    Take 1 tablet by mouth 2 times daily for 10 days    FLUCONAZOLE (DIFLUCAN) 150 MG TABLET    Take 1 tablet by mouth once for 1 dose    METRONIDAZOLE (FLAGYL) 500 MG TABLET    Take 1 tablet by mouth 2 times daily for 7 days     Controlled Substances Monitoring:     No flowsheet data found. (Please note that portions of this note were completed with a voice recognition program.  Efforts were made to edit the dictations but occasionally words are mis-transcribed. )    [address] Dario Fortune DO (electronically signed)  Attending Emergency Physician          [facility] Hospital[facility]  12/05/21 5830

## 2021-12-05 NOTE — ED NOTES
Dc'd to home  Aware no sex until both she and partner have negative test results  Aware how to check my chart test results  Aware how and why to take all meds     Daphine Romberg, RN  12/05/21 8777

## 2021-12-06 LAB
C TRACH DNA GENITAL QL NAA+PROBE: NEGATIVE
N. GONORRHOEAE DNA: NEGATIVE

## 2021-12-19 ENCOUNTER — HOSPITAL ENCOUNTER (EMERGENCY)
Age: 41
Discharge: HOME | End: 2021-12-19
Attending: EMERGENCY MEDICINE
Payer: MEDICAID

## 2021-12-19 ENCOUNTER — APPOINTMENT (OUTPATIENT)
Dept: GENERAL RADIOLOGY | Age: 41
End: 2021-12-19
Payer: MEDICAID

## 2021-12-19 VITALS
DIASTOLIC BLOOD PRESSURE: 63 MMHG | RESPIRATION RATE: 20 BRPM | OXYGEN SATURATION: 100 % | TEMPERATURE: 99.3 F | HEIGHT: 64 IN | SYSTOLIC BLOOD PRESSURE: 140 MMHG | WEIGHT: 241.84 LBS | HEART RATE: 100 BPM | BODY MASS INDEX: 41.29 KG/M2

## 2021-12-19 DIAGNOSIS — B34.9 VIRAL SYNDROME: Primary | ICD-10-CM

## 2021-12-19 DIAGNOSIS — Z20.822 SUSPECTED COVID-19 VIRUS INFECTION: ICD-10-CM

## 2021-12-19 LAB
RAPID INFLUENZA  B AGN: NEGATIVE
RAPID INFLUENZA A AGN: NEGATIVE

## 2021-12-19 PROCEDURE — 6370000000 HC RX 637 (ALT 250 FOR IP): Performed by: PHYSICIAN ASSISTANT

## 2021-12-19 PROCEDURE — 87804 INFLUENZA ASSAY W/OPTIC: CPT

## 2021-12-19 PROCEDURE — 71046 X-RAY EXAM CHEST 2 VIEWS: CPT

## 2021-12-19 PROCEDURE — 99283 EMERGENCY DEPT VISIT LOW MDM: CPT

## 2021-12-19 PROCEDURE — U0003 INFECTIOUS AGENT DETECTION BY NUCLEIC ACID (DNA OR RNA); SEVERE ACUTE RESPIRATORY SYNDROME CORONAVIRUS 2 (SARS-COV-2) (CORONAVIRUS DISEASE [COVID-19]), AMPLIFIED PROBE TECHNIQUE, MAKING USE OF HIGH THROUGHPUT TECHNOLOGIES AS DESCRIBED BY CMS-2020-01-R: HCPCS

## 2021-12-19 PROCEDURE — U0005 INFEC AGEN DETEC AMPLI PROBE: HCPCS

## 2021-12-19 RX ORDER — IBUPROFEN 800 MG/1
800 TABLET ORAL ONCE
Status: COMPLETED | OUTPATIENT
Start: 2021-12-19 | End: 2021-12-19

## 2021-12-19 RX ORDER — IBUPROFEN 800 MG/1
800 TABLET ORAL EVERY 8 HOURS PRN
Qty: 20 TABLET | Refills: 0 | Status: SHIPPED | OUTPATIENT
Start: 2021-12-19

## 2021-12-19 RX ORDER — BENZOCAINE AND MENTHOL, UNSPECIFIED FORM 15; 2.3 MG/1; MG/1
1 LOZENGE ORAL
Qty: 20 LOZENGE | Refills: 0 | Status: SHIPPED | OUTPATIENT
Start: 2021-12-19

## 2021-12-19 RX ORDER — ACETAMINOPHEN 325 MG/1
325 TABLET ORAL ONCE
Status: COMPLETED | OUTPATIENT
Start: 2021-12-19 | End: 2021-12-19

## 2021-12-19 RX ORDER — ACETAMINOPHEN 325 MG/1
650 TABLET ORAL EVERY 6 HOURS PRN
Qty: 30 TABLET | Refills: 0 | Status: SHIPPED | OUTPATIENT
Start: 2021-12-19

## 2021-12-19 RX ORDER — HYDROCODONE BITARTRATE AND ACETAMINOPHEN 5; 325 MG/1; MG/1
1 TABLET ORAL ONCE
Status: COMPLETED | OUTPATIENT
Start: 2021-12-19 | End: 2021-12-19

## 2021-12-19 RX ADMIN — ACETAMINOPHEN 325 MG: 325 TABLET ORAL at 18:27

## 2021-12-19 RX ADMIN — IBUPROFEN 800 MG: 800 TABLET, FILM COATED ORAL at 16:30

## 2021-12-19 RX ADMIN — HYDROCODONE BITARTRATE AND ACETAMINOPHEN 1 TABLET: 5; 325 TABLET ORAL at 18:28

## 2021-12-19 ASSESSMENT — PAIN SCALES - GENERAL
PAINLEVEL_OUTOF10: 7

## 2021-12-19 ASSESSMENT — PAIN DESCRIPTION - ONSET: ONSET: PROGRESSIVE

## 2021-12-19 ASSESSMENT — PAIN DESCRIPTION - PAIN TYPE: TYPE: ACUTE PAIN

## 2021-12-19 ASSESSMENT — PAIN DESCRIPTION - LOCATION: LOCATION: CHEST;THROAT

## 2021-12-19 NOTE — ED PROVIDER NOTES
[address] [facility] ENCOUNTER        Pt Name: Sonam Dale  MRN: [id number]  [place] 1980  Date of evaluation: 2021  Provider: ADRIENNE Hemphill      ADVANCED PRACTICE PROVIDER, I HAVE EVALUATED THIS PATIENT    CHIEF COMPLAINT     Subjective fever, cough, sore throat      HISTORY OF PRESENT ILLNESS  (Location/Symptom, Timing/Onset, Context/Setting, Quality, Duration,Modifying Factors, Severity.)   Sonam Dale is a 46 y.o. female who presents to the emergencydepartment for subjective fever, chills, sore throat, nonproductive cough for the past 2 days. Has not taken any Tylenol ibuprofen yet today. Did take aspirin last night. Feels like her chest is burning. Denies nausea vomiting diarrhea. Denies sick contacts. Had COVID 4 months ago. Has not been vaccinated for Covid or influenza. Nursing Notes were reviewed and I agree. REVIEW OF SYSTEMS    (2-9 systems for level 4, 10 or more for level 5)   Review of Systems     Pertinent positives and negatives as per HPI. PAST MEDICAL HISTORY         Diagnosis Date    Anxiety     Asthma        SURGICAL HISTORY         Procedure Laterality Date     SECTION         CURRENT MEDICATIONS       Previous Medications    ALBUTEROL (PROVENTIL HFA;VENTOLIN HFA) 108 (90 BASE) MCG/ACT INHALER    Inhale 2 puffs into the lungs every 6 hours as needed for Wheezing. ALBUTEROL SULFATE HFA (PROVENTIL HFA) 108 (90 BASE) MCG/ACT INHALER    Inhale 2 puffs into the lungs every 4 hours as needed for Wheezing or Shortness of Breath (Space out to every 6 hours as symptoms improve) Space out to every 6 hours as symptoms improve.     CETIRIZINE (ZYRTEC) 10 MG TABLET    Take 1 tablet by mouth daily    FERROUS SULFATE 300 (60 FE) MG/5ML SYRUP    Take 5 mLs by mouth daily    HYDROXYZINE (ATARAX) 25 MG TABLET    Take 25 mg by mouth 3 times daily as needed    SERTRALINE HCL (ZOLOFT PO)    Take by mouth and MRI are read by the radiologist. Plain radiographic images are visualized and preliminarily interpreted by ADRIENNE Balbuena with the below findings:      Interpretation per the Radiologist below, if available at the time of this note:    XR CHEST (2 VW)   Final Result   No acute cardiopulmonary disease. LABS:  Labs Reviewed   RAPID INFLUENZA A/B ANTIGENS    Narrative:     Performed at:  [facility]  [address], [facility]   Phone [phone]       All other labs were within normal range or not returned as of this dictation. EMERGENCY DEPARTMENT COURSE and DIFFERENTIALDIAGNOSIS/MDM:   Vitals:    Vitals:    12/19/21 1548 12/19/21 1800   BP: 136/82 (!) 140/63   Pulse: 108 102   Resp: 20 20   Temp: 99.3 °F (37.4 °C)    TempSrc: Oral    SpO2: 100% 100%   Weight: 241 lb 13.5 oz (109.7 kg)    Height: 5' 4\" (1.626 m)        Patient wasnontoxic, well appearing, afebrile with normal vital signs with exception of tachycardia with a rate of 108. No signs of strep throat on exam.  Was given ibuprofen. Covid was sent. Influenza was negative. Upon reevaluation, appears well. Believe appropriate for outpatient management. Suspect this is viral in nature - COVID vs other viral etiology. Will dc with ibuprofen, tylenol, cepacol lozenges. Instructed to FU with PCP in next few days for reeval and return for worsening. She agreed and udnerstood      PROCEDURES:  None    FINAL IMPRESSION      1. Viral syndrome    2.  Suspected COVID-19 virus infection        DISPOSITION/PLAN   DISPOSITION Decision To Discharge 12/19/2021 06:17:55 PM      PATIENT REFERRED TO:  [address] [place] [address]    Schedule an appointment as soon as possible for a visit in 2 days  for reevaluation    [address]  [place] [address] [phone]    As needed, If symptoms worsen      DISCHARGE

## 2021-12-20 ENCOUNTER — CARE COORDINATION (OUTPATIENT)
Dept: CARE COORDINATION | Age: 41
End: 2021-12-20

## 2021-12-20 LAB — SARS-COV-2: DETECTED

## 2021-12-20 NOTE — CARE COORDINATION
Patient contacted regarding COVID-19 risk and diagnosis. Discussed COVID-19 related testing which was available at this time. Test results were positive. Patient informed of results, if available? Yes. Ambulatory Care Manager contacted the patient by telephone to perform post discharge assessment. Call within 2 business days of discharge: Yes. Verified name and  with patient as identifiers. Provided introduction to self, and explanation of the CTN/ACM role, and reason for call due to risk factors for infection and/or exposure to COVID-19. Symptoms reviewed with patient who verbalized the following symptoms: no new symptoms and no worsening symptoms. Due to no new or worsening symptoms encounter was not routed to provider for escalation. Discussed follow-up appointments. If no appointment was previously scheduled, appointment scheduling offered: will call. [facility] follow up appointment(s): No future appointments. Non-[facility] follow up appointment(s): see above    Non-face-to-face services provided:  see above     Advance Care Planning:   Does patient have an Advance Directive:  not on file. Educated patient about risk for severe COVID-19 due to risk factors according to CDC guidelines. ACM reviewed discharge instructions, medical action plan and red flag symptoms with the patient who verbalized understanding. Discussed COVID vaccination status: No. Education provided on COVID-19 vaccination as appropriate. Discussed exposure protocols and quarantine with CDC Guidelines. Patient was given an opportunity to verbalize any questions and concerns and agrees to contact ACM or health care provider for questions related to their healthcare. Reviewed and educated patient on any new and changed medications related to discharge diagnosis     Was patient discharged with a pulse oximeter? No Discussed and confirmed pulse oximeter discharge instructions and when to notify provider or seek emergency care. ACM provided contact information. Plan for follow-up call in 5-7 days based on severity of symptoms and risk factors. Discussed to increase fluids, warm teas, broths, warm salt water gargles, OTC motrin or tylenol as directed, cough drops, warm mist humidifier with HOB up and gatorade or pedialyte.

## 2021-12-27 ENCOUNTER — CARE COORDINATION (OUTPATIENT)
Dept: CARE COORDINATION | Age: 41
End: 2021-12-27

## 2021-12-27 NOTE — CARE COORDINATION
Patient contacted regarding COVID-19 diagnosis. Discussed COVID-19 related testing which was available at this time. Test results were positive. Patient informed of results, if available? Yes    Ambulatory Care Manager contacted the patient by telephone to perform follow-up assessment. Verified name and  with patient as identifiers. Patient has following risk factors of: no known risk factors. Symptoms reviewed with patient who verbalized the following symptoms: no new symptoms and no worsening symptoms. Due to no new or worsening symptoms encounter was not routed to provider for escalation. Educated patient about risk for severe COVID-19 due to risk factors according to CDC guidelines. ACM reviewed discharge instructions, medical action plan and red flag symptoms with the patient who verbalized understanding. Discussed COVID vaccination status: No. Education provided on COVID-19 vaccination as appropriate. Discussed exposure protocols and quarantine with CDC Guidelines. Patient was given an opportunity to verbalize any questions and concerns and agrees to contact ACM or health care provider for questions related to their healthcare. Was patient discharged with a pulse oximeter? No Discussed and confirmed pulse oximeter discharge instructions and when to notify provider or seek emergency care. ACM provided contact information. No further follow-up call identified based on severity of symptoms and risk factors. Reports is feeling much better now.

## 2022-02-02 ENCOUNTER — APPOINTMENT (OUTPATIENT)
Dept: GENERAL RADIOLOGY | Age: 42
End: 2022-02-02
Payer: MEDICAID

## 2022-02-02 ENCOUNTER — HOSPITAL ENCOUNTER (EMERGENCY)
Age: 42
Discharge: HOME | End: 2022-02-02
Attending: EMERGENCY MEDICINE
Payer: MEDICAID

## 2022-02-02 VITALS
BODY MASS INDEX: 41.21 KG/M2 | WEIGHT: 241.4 LBS | HEIGHT: 64 IN | TEMPERATURE: 98.8 F | RESPIRATION RATE: 20 BRPM | DIASTOLIC BLOOD PRESSURE: 84 MMHG | SYSTOLIC BLOOD PRESSURE: 134 MMHG | HEART RATE: 99 BPM | OXYGEN SATURATION: 100 %

## 2022-02-02 DIAGNOSIS — R52 GENERALIZED BODY ACHES: ICD-10-CM

## 2022-02-02 DIAGNOSIS — B34.9 VIRAL SYNDROME: ICD-10-CM

## 2022-02-02 DIAGNOSIS — J40 BRONCHITIS: Primary | ICD-10-CM

## 2022-02-02 LAB
RAPID INFLUENZA  B AGN: NEGATIVE
RAPID INFLUENZA A AGN: NEGATIVE

## 2022-02-02 PROCEDURE — 99284 EMERGENCY DEPT VISIT MOD MDM: CPT

## 2022-02-02 PROCEDURE — 87804 INFLUENZA ASSAY W/OPTIC: CPT

## 2022-02-02 PROCEDURE — 6370000000 HC RX 637 (ALT 250 FOR IP)

## 2022-02-02 PROCEDURE — 71046 X-RAY EXAM CHEST 2 VIEWS: CPT

## 2022-02-02 PROCEDURE — U0003 INFECTIOUS AGENT DETECTION BY NUCLEIC ACID (DNA OR RNA); SEVERE ACUTE RESPIRATORY SYNDROME CORONAVIRUS 2 (SARS-COV-2) (CORONAVIRUS DISEASE [COVID-19]), AMPLIFIED PROBE TECHNIQUE, MAKING USE OF HIGH THROUGHPUT TECHNOLOGIES AS DESCRIBED BY CMS-2020-01-R: HCPCS

## 2022-02-02 PROCEDURE — U0005 INFEC AGEN DETEC AMPLI PROBE: HCPCS

## 2022-02-02 RX ORDER — ALBUTEROL SULFATE 90 UG/1
2 AEROSOL, METERED RESPIRATORY (INHALATION) 4 TIMES DAILY PRN
Qty: 18 G | Refills: 0 | Status: SHIPPED | OUTPATIENT
Start: 2022-02-02

## 2022-02-02 RX ORDER — IBUPROFEN 600 MG/1
600 TABLET ORAL ONCE
Status: COMPLETED | OUTPATIENT
Start: 2022-02-02 | End: 2022-02-02

## 2022-02-02 RX ORDER — PREDNISONE 20 MG/1
40 TABLET ORAL ONCE
Status: COMPLETED | OUTPATIENT
Start: 2022-02-02 | End: 2022-02-02

## 2022-02-02 RX ORDER — PREDNISONE 20 MG/1
20 TABLET ORAL DAILY
Qty: 3 TABLET | Refills: 0 | Status: SHIPPED | OUTPATIENT
Start: 2022-02-02 | End: 2022-02-05

## 2022-02-02 RX ADMIN — PREDNISONE 40 MG: 20 TABLET ORAL at 18:51

## 2022-02-02 RX ADMIN — IBUPROFEN 600 MG: 600 TABLET, FILM COATED ORAL at 18:26

## 2022-02-02 ASSESSMENT — ENCOUNTER SYMPTOMS
TROUBLE SWALLOWING: 0
CONSTIPATION: 0
NAUSEA: 0
COUGH: 0
SORE THROAT: 0
RHINORRHEA: 0
ABDOMINAL PAIN: 0
DIARRHEA: 0
WHEEZING: 0
BACK PAIN: 0
VOMITING: 0
SHORTNESS OF BREATH: 1
EYE PAIN: 0

## 2022-02-02 ASSESSMENT — PAIN SCALES - GENERAL: PAINLEVEL_OUTOF10: 0

## 2022-02-02 NOTE — ED PROVIDER NOTES
[facility] ENCOUNTER        Pt Name: Misbah Salazar  MRN: [id number]  [place] 1980  Date of evaluation: 2/2/2022  Provider: ANUSHA Antunez CNP  PCP: [address] [place] [address]  Note Started: 6:42 PM EST       I have seen and evaluated this patient with my supervising physician Adriel Flores MD.      [address]       Chief Complaint   Patient presents with    Generalized Body Aches     x1day, with sob         HISTORY OF PRESENT ILLNESS   (Location/Symptom, Timing/Onset, Context/Setting, Quality, Duration, Modifying Factors, Severity)  Note limiting factors. Chief Complaint: 1 day of generalized body aches    Misbah Salazar is a 46 y.o. female who presents to the ED reporting generalized body aches which started yesterday p.m. Had some symptom relief with aspirin and was able to go to work today however symptoms restarted once the aspirin wore off and she came to the ED to be evaluated. Patient is concerned about Covid, influenza and is requesting a chest x-ray. No real alleviating or aggravating factors. No nausea or vomiting. Patient is a smoker smoking about 4 black miles a day. She does have a history of asthma but denies any excessive use of her home albuterol. She is requesting prescription and a dose of steroid here and is concerned of being unable to the pharmacy because of snow tonight. Patient denies any chest pain, SCHERER, cough, loss of taste or smell, tachypnea. Nursing Notes were all reviewed and agreed with or any disagreements were addressed in the HPI. REVIEW OF SYSTEMS    (2-9 systems for level 4, 10 or more for level 5)     Review of Systems   Constitutional: Negative for chills, diaphoresis and fever. HENT: Negative for congestion, rhinorrhea, sore throat and trouble swallowing. Eyes: Negative for pain and visual disturbance. Respiratory: Positive for shortness of breath.  Negative for cough and wheezing. Cardiovascular: Negative for chest pain and leg swelling. Gastrointestinal: Negative for abdominal pain, constipation, diarrhea, nausea and vomiting. Genitourinary: Negative for frequency and hematuria. Musculoskeletal: Negative for back pain and neck pain. Body aches all over   Skin: Negative for rash and wound. Neurological: Negative for dizziness and light-headedness. PAST MEDICAL HISTORY     Past Medical History:   Diagnosis Date    Anxiety     Asthma        SURGICAL HISTORY     Past Surgical History:   Procedure Laterality Date     SECTION         [id number]       Discharge Medication List as of 2022  7:15 PM      CONTINUE these medications which have NOT CHANGED    Details   ibuprofen (ADVIL;MOTRIN) 800 MG tablet Take 1 tablet by mouth every 8 hours as needed for Pain, Disp-20 tablet, R-0Normal      acetaminophen (AMINOFEN) 325 MG tablet Take 2 tablets by mouth every 6 hours as needed for Pain, Disp-30 tablet, R-0Normal      Benzocaine-Menthol (CEPACOL) 15-2.3 MG LOZG Take 1 lozenge by mouth every 2 hours as needed (sore throat), Disp-20 lozenge, R-0Normal      hydrOXYzine (ATARAX) 25 MG tablet Take 25 mg by mouth 3 times daily as neededHistorical Med      cetirizine (ZYRTEC) 10 MG tablet Take 1 tablet by mouth daily, Disp-30 tablet, R-0Normal      ferrous sulfate 300 (60 Fe) MG/5ML syrup Take 5 mLs by mouth daily, Disp-300 mL, R-3Normal      Sertraline HCl (ZOLOFT PO) Take by mouth      !! albuterol sulfate HFA (PROVENTIL HFA) 108 (90 BASE) MCG/ACT inhaler Inhale 2 puffs into the lungs every 4 hours as needed for Wheezing or Shortness of Breath (Space out to every 6 hours as symptoms improve) Space out to every 6 hours as symptoms improve., Disp-1 Inhaler, R-0      !! albuterol (PROVENTIL HFA;VENTOLIN HFA) 108 (90 BASE) MCG/ACT inhaler Inhale 2 puffs into the lungs every 6 hours as needed for Wheezing. !! - Potential duplicate medications found. Please discuss with provider. ALLERGIES     Pcn [penicillins]    FAMILYHISTORY     No family history on file. SOCIAL HISTORY       Social History     Socioeconomic History    Marital status: Single     Spouse name: Not on file    Number of children: Not on file    Years of education: Not on file    Highest education level: Not on file   Occupational History    Not on file   Tobacco Use    Smoking status: Current Every Day Smoker     Packs/day: 3.00     Types: Cigars    Smokeless tobacco: Current User    Tobacco comment: 2 black and milds a day   Vaping Use    Vaping Use: Never used   Substance and Sexual Activity    Alcohol use: No    Drug use: Never    Sexual activity: Not Currently   Other Topics Concern    Not on file   Social History Narrative    Not on file     Social Determinants of Health     Financial Resource Strain:     Difficulty of Paying Living Expenses: Not on file   Food Insecurity:     Worried About Running Out of Food in the Last Year: Not on file    Jimmy of Food in the Last Year: Not on file   Transportation Needs:     Lack of Transportation (Medical): Not on file    Lack of Transportation (Non-Medical):  Not on file   Physical Activity:     Days of Exercise per Week: Not on file    Minutes of Exercise per Session: Not on file   Stress:     Feeling of Stress : Not on file   Social Connections:     Frequency of Communication with Friends and Family: Not on file    Frequency of Social Gatherings with Friends and Family: Not on file    Attends Yazdanism Services: Not on file    Active Member of Clubs or Organizations: Not on file    Attends Club or Organization Meetings: Not on file    Marital Status: Not on file   Intimate Partner Violence:     Fear of Current or Ex-Partner: Not on file    Emotionally Abused: Not on file    Physically Abused: Not on file    Sexually Abused: Not on file   Housing Stability:     Unable to Pay for Housing in the Last Year: Not on file    Number of Places Lived in the Last Year: Not on file    Unstable Housing in the Last Year: Not on file       SCREENINGS             PHYSICAL EXAM    (up to 7 for level 4, 8 or more for level 5)     ED Triage Vitals [02/02/22 1752]   BP Temp Temp Source Pulse Resp SpO2 Height Weight   134/84 98.8 °F (37.1 °C) Oral 99 20 100 % 5' 4\" (1.626 m) 241 lb 6.5 oz (109.5 kg)       Physical Exam  Vitals and nursing note reviewed. Constitutional:       General: She is not in acute distress. Appearance: Normal appearance. She is obese. She is not ill-appearing, toxic-appearing or diaphoretic. HENT:      Head: Normocephalic and atraumatic. Right Ear: External ear normal.      Left Ear: External ear normal.      Nose: Nose normal. No congestion or rhinorrhea. Mouth/Throat:      Mouth: Mucous membranes are moist.      Pharynx: Oropharynx is clear. No oropharyngeal exudate or posterior oropharyngeal erythema. Eyes:      General: No scleral icterus. Right eye: No discharge. Left eye: No discharge. Extraocular Movements: Extraocular movements intact. Conjunctiva/sclera: Conjunctivae normal.      Pupils: Pupils are equal, round, and reactive to light. Cardiovascular:      Rate and Rhythm: Normal rate and regular rhythm. Pulses: Normal pulses. Heart sounds: Normal heart sounds. No murmur heard. No friction rub. No gallop. Pulmonary:      Effort: Pulmonary effort is normal. No respiratory distress. Breath sounds: Normal breath sounds. No stridor. No wheezing, rhonchi or rales. Abdominal:      General: There is no distension. Palpations: Abdomen is soft. Tenderness: There is no abdominal tenderness. There is no guarding. Musculoskeletal:         General: No deformity. Normal range of motion. Cervical back: Normal range of motion and neck supple. No rigidity. Right lower leg: No edema. Left lower leg: No edema.    Lymphadenopathy: Cervical: No cervical adenopathy. Skin:     General: Skin is warm and dry. Capillary Refill: Capillary refill takes less than 2 seconds. Coloration: Skin is not jaundiced. Findings: No rash. Neurological:      General: No focal deficit present. Mental Status: She is alert and oriented to person, place, and time. Psychiatric:         Mood and Affect: Mood normal.         Behavior: Behavior normal.         DIAGNOSTIC RESULTS   LABS:    Labs Reviewed   RAPID INFLUENZA A/B ANTIGENS    Narrative:     Performed at:  [facility]  [address], [facility]   Phone ([phone]       When ordered, only abnormal lab results are displayed. All other labs were within normal range or not returned as of this dictation. EKG: When ordered, EKG's are interpreted by the Emergency Department Physician in the absence of a cardiologist.  Please see their note for interpretation of EKG. RADIOLOGY:   Non-plain film images such as CT, Ultrasound and MRI are read by the radiologist. [facility] radiographic images are visualized andpreliminarily interpreted by the  ED Provider with the below findings:        Interpretation pert Radiologist below, if available at the time of this note:    XR CHEST (2 VW)   Final Result   No acute process. No results found.       PROCEDURES   Unless otherwise noted below, none     Procedures    CRITICAL CARE TIME   N/A    CONSULTS:  None      EMERGENCY DEPARTMENT COURSE and DIFFERENTIAL DIAGNOSIS/MDM:   Vitals:    Vitals:    02/02/22 1752   BP: 134/84   Pulse: 99   Resp: 20   Temp: 98.8 °F (37.1 °C)   TempSrc: Oral   SpO2: 100%   Weight: 241 lb 6.5 oz (109.5 kg)   Height: 5' 4\" (1.626 m)       Patient was given thefollowing medications:  Medications   ibuprofen (ADVIL;MOTRIN) tablet 600 mg (600 mg Oral Given 2/2/22 1826)   predniSONE (DELTASONE) tablet 40 mg (40 mg Oral Given 2/2/22 1851) Otherwise well-appearing 28-year-old female presented to ED with concern for Covid test, influenza test and requesting chest x-ray. Please see presentation HPI. Differential Diagnosis: Asthma exacerbation, COVID-19, influenza pneumonia, Pneumothorax, other  PE with good breath sounds in all lung fields, patient moving air well this sounds like her baseline. COVID-19 PCR and rapid influenza ordered. Covid pending and influenza negative at time of discharge  Chest x-ray reviewed as above. Patient given 1 dose of steroid per request here patient to be sent home with brief course of steroids and inhaler for reactive airway. Patient appropriate to be managed outpatient. Patient given isolation precautions pending results and discharged home  I discussed with patient the results of evaluation in the ED, diagnosis, care, and prognosis. The plan is to discharge to home. Patient is in agreement with plan and questions have been answered. I also discussed with patient the reasons which may require a return visit and the importance of follow-up care. The patient is well-appearing, nontoxic, and improved at the time of discharge. Patient agrees to call to arrange follow-up care as directed. Patient understands to return immediately for worsening/change in symptoms. FINAL IMPRESSION      1. Bronchitis    2. Viral syndrome    3.  Generalized body aches          DISPOSITION/PLAN   DISPOSITION        PATIENT REFERREDTO:  [facility]    Schedule an appointment as soon as possible for a visit in 3 days  Follow up within 3 days, Return to ED sooner if symptoms worsen      DISCHARGE MEDICATIONS:  Discharge Medication List as of 2/2/2022  7:15 PM      START taking these medications    Details   predniSONE (DELTASONE) 20 MG tablet Take 1 tablet by mouth daily for 3 days, Disp-3 tablet, R-0Normal      !! albuterol sulfate HFA (VENTOLIN HFA) 108 (90 Base) MCG/ACT inhaler Inhale 2 puffs into the lungs 4 times daily as needed for Wheezing, Disp-18 g, R-0Normal       !! - Potential duplicate medications found. Please discuss with provider.           DISCONTINUED MEDICATIONS:  Discharge Medication List as of 2/2/2022  7:15 PM                 (Please note that portions ofthis note were completed with a voice recognition program.  Efforts were made to edit the dictations but occasionally words are mis-transcribed.)    ANUSHA Max CNP (electronically signed)             ANUSHA Max CNP  02/02/22 2122

## 2022-02-02 NOTE — Clinical Note
Nicanor Quintero was seen and treated in our emergency department on 2/2/2022. She may return to work on 02/07/2022. If you have any questions or concerns, please don't hesitate to call.       Bobby Maradiaga RN

## 2022-02-02 NOTE — ED PROVIDER NOTES
[facility]  eMERGENCY dEPARTMENT eNCOUnter   Physician Attestation    Pt Name: Evelin Nunez  MRN: [id number]  [place] 1980  Date of evaluation: 2/2/22        Physician Note:    I havepersonally performed and/or participated in the history, exam and medical decision making and agree with all pertinent clinical information. I have also reviewed and agree with the past medical, family and social historyunless otherwise noted. I have personally performed a face to face diagnostic evaluation onthis patient. I have reviewed the mid-levels findings and agree. History: This is a 53-year-old female complaining of nonproductive cough headache shortness of breath since yesterday. No fever. She does have a history of asthma. However, she has not had to use her inhaler. She is worried about Covid and influenza. Physical Exam  Vitals and nursing note reviewed. Constitutional:       Appearance: She is well-developed. She is not diaphoretic. HENT:      Head: Normocephalic and atraumatic. Right Ear: External ear normal.      Left Ear: External ear normal.   Eyes:      General: No scleral icterus. Right eye: No discharge. Left eye: No discharge. Conjunctiva/sclera: Conjunctivae normal.   Neck:      Trachea: No tracheal deviation. Cardiovascular:      Rate and Rhythm: Normal rate and regular rhythm. Heart sounds: Normal heart sounds. Pulmonary:      Effort: Pulmonary effort is normal. No respiratory distress. Breath sounds: Normal breath sounds. No stridor. Musculoskeletal:         General: Normal range of motion. Cervical back: Normal range of motion. Skin:     General: Skin is warm and dry. Neurological:      Mental Status: She is alert and oriented to person, place, and time.       Coordination: Coordination normal.   Psychiatric:         Behavior: Behavior normal.     XR CHEST (2 VW)    Result Date: 2/2/2022  EXAMINATION: TWO XRAY VIEWS OF THE CHEST 2/2/2022 6:43 pm COMPARISON: 12/19/2021 HISTORY: ORDERING SYSTEM PROVIDED HISTORY: sob TECHNOLOGIST PROVIDED HISTORY: Reason for exam:->sob Reason for Exam: PT. C/O GENERALIZED BODYACHES WITH SOB X 1 DAY Relevant Medical/Surgical History: HX ASTHMA FINDINGS: The lungs are without acute focal process. There is no effusion or pneumothorax. The cardiomediastinal silhouette is stable. The osseous structures are stable. No acute process. Results for orders placed or performed during the hospital encounter of 02/02/22   Rapid influenza A/B antigens    Specimen: Nasopharyngeal   Result Value Ref Range    Rapid Influenza A Ag Negative Negative    Rapid Influenza B Ag Negative Negative   COVID-19   Result Value Ref Range    SARS-CoV-2 Not Detected Not detected           1. Bronchitis    2. Viral syndrome    3. Generalized body aches          DISPOSITION/PLAN  PATIENT REFERRED TO:  [facility]    Schedule an appointment as soon as possible for a visit in 3 days  Follow up within 3 days, Return to ED sooner if symptoms worsen    DISCHARGE MEDICATIONS:  Discharge Medication List as of 2/2/2022  7:15 PM      START taking these medications    Details   predniSONE (DELTASONE) 20 MG tablet Take 1 tablet by mouth daily for 3 days, Disp-3 tablet, R-0Normal      !! albuterol sulfate HFA (VENTOLIN HFA) 108 (90 Base) MCG/ACT inhaler Inhale 2 puffs into the lungs 4 times daily as needed for Wheezing, Disp-18 g, R-0Normal       !! - Potential duplicate medications found. Please discuss with provider.             MD Bobby Judd MD  02/03/22 7434

## 2022-02-03 LAB — SARS-COV-2: NOT DETECTED

## 2022-06-11 ENCOUNTER — HOSPITAL ENCOUNTER (EMERGENCY)
Age: 42
Discharge: HOME | End: 2022-06-11
Payer: MEDICAID

## 2022-06-11 VITALS
TEMPERATURE: 97 F | DIASTOLIC BLOOD PRESSURE: 82 MMHG | BODY MASS INDEX: 41.99 KG/M2 | WEIGHT: 252 LBS | HEART RATE: 81 BPM | OXYGEN SATURATION: 98 % | SYSTOLIC BLOOD PRESSURE: 140 MMHG | RESPIRATION RATE: 16 BRPM | HEIGHT: 65 IN

## 2022-06-11 DIAGNOSIS — K59.00 CONSTIPATION, UNSPECIFIED CONSTIPATION TYPE: Primary | ICD-10-CM

## 2022-06-11 DIAGNOSIS — R30.0 DYSURIA: ICD-10-CM

## 2022-06-11 LAB
AMORPHOUS: ABNORMAL /HPF
BACTERIA WET PREP: NORMAL
BACTERIA: ABNORMAL /HPF
BILIRUBIN URINE: NEGATIVE
BLOOD, URINE: NEGATIVE
CLARITY: ABNORMAL
CLUE CELLS: NORMAL
COLOR: YELLOW
EPITHELIAL CELLS WET PREP: NORMAL
EPITHELIAL CELLS, UA: ABNORMAL /HPF (ref 0–5)
GLUCOSE URINE: NEGATIVE MG/DL
HCG(URINE) PREGNANCY TEST: NEGATIVE
KETONES, URINE: NEGATIVE MG/DL
LEUKOCYTE ESTERASE, URINE: NEGATIVE
MICROSCOPIC EXAMINATION: YES
NITRITE, URINE: NEGATIVE
PH UA: 8 (ref 5–8)
PROTEIN UA: NEGATIVE MG/DL
RBC UA: ABNORMAL /HPF (ref 0–4)
RBC WET PREP: NORMAL
SOURCE WET PREP: NORMAL
SPECIFIC GRAVITY UA: 1.02 (ref 1–1.03)
TRICHOMONAS PREP: NORMAL
URINE REFLEX TO CULTURE: ABNORMAL
URINE TYPE: ABNORMAL
UROBILINOGEN, URINE: 0.2 E.U./DL
WBC UA: ABNORMAL /HPF (ref 0–5)
WBC WET PREP: NORMAL
YEAST WET PREP: NORMAL

## 2022-06-11 PROCEDURE — 99283 EMERGENCY DEPT VISIT LOW MDM: CPT

## 2022-06-11 PROCEDURE — 84703 CHORIONIC GONADOTROPIN ASSAY: CPT

## 2022-06-11 PROCEDURE — 81001 URINALYSIS AUTO W/SCOPE: CPT

## 2022-06-11 PROCEDURE — 87591 N.GONORRHOEAE DNA AMP PROB: CPT

## 2022-06-11 PROCEDURE — 87210 SMEAR WET MOUNT SALINE/INK: CPT

## 2022-06-11 PROCEDURE — 87491 CHLMYD TRACH DNA AMP PROBE: CPT

## 2022-06-11 RX ORDER — DOCUSATE SODIUM 100 MG/1
100 CAPSULE, LIQUID FILLED ORAL 2 TIMES DAILY
Qty: 30 CAPSULE | Refills: 0 | Status: SHIPPED | OUTPATIENT
Start: 2022-06-11 | End: 2022-06-26

## 2022-06-11 RX ORDER — MAGNESIUM CARB/ALUMINUM HYDROX 105-160MG
TABLET,CHEWABLE ORAL
Qty: 296 ML | Refills: 0 | Status: SHIPPED | OUTPATIENT
Start: 2022-06-11

## 2022-06-11 ASSESSMENT — PAIN - FUNCTIONAL ASSESSMENT
PAIN_FUNCTIONAL_ASSESSMENT: NONE - DENIES PAIN
PAIN_FUNCTIONAL_ASSESSMENT: NONE - DENIES PAIN

## 2022-06-11 NOTE — ED PROVIDER NOTES
**ADVANCED PRACTICE PROVIDER, I HAVE EVALUATED THIS PATIENT**        [address] ENCOUNTER      Pt Name: Annalise Nunez  UZN:[id number]  [place] 1980  Date of evaluation: 2022  Provider: Frances Broderick PA-C      Chief Complaint:    Chief Complaint   Patient presents with    Hemorrhoids     Pt states it hurst to have a BM and burns to \"pee\"         Nursing Notes, Past Medical Hx, Past Surgical Hx, Social Hx, Allergies, and Family Hx were all reviewed and agreed with or any disagreements were addressed in the HPI.    HPI: (Location, Duration, Timing, Severity, Quality, Assoc Sx, Context, Modifying factors)    Chief Complaint of trouble passing stool    This is a  46 y.o. female who presents stating that over the last 7 days or so she has had a difficult time passing stool. She states that this is not common for her. She states that she has started using a little Preparation H because she has had a small amount of bleeding once or twice after passing the stool. She states that she really needs help so because the stool is hard which makes it hard to pass. She has also noticed a small amount of burning on urination intermittently over the course of the last day or 2. No fevers or chills or back pain. No abdominal pain vomiting or diarrhea. No blood in urine. Patient indicates that she has not yet eaten today because her belly feels too full. She states that she has never had bowel obstruction or abdominal surgery other than  previously. No pain at this time. No active rectal bleeding at this time per patient.   PastMedical/Surgical History:      Diagnosis Date    Anxiety     Asthma          Procedure Laterality Date     SECTION         Medications:  Previous Medications    ACETAMINOPHEN (AMINOFEN) 325 MG TABLET    Take 2 tablets by mouth every 6 hours as needed for Pain    ALBUTEROL (PROVENTIL HFA;VENTOLIN HFA) 108 (90 BASE) MCG/ACT INHALER    Inhale 2 puffs into the lungs every 6 hours as needed for Wheezing. ALBUTEROL SULFATE HFA (PROVENTIL HFA) 108 (90 BASE) MCG/ACT INHALER    Inhale 2 puffs into the lungs every 4 hours as needed for Wheezing or Shortness of Breath (Space out to every 6 hours as symptoms improve) Space out to every 6 hours as symptoms improve. ALBUTEROL SULFATE HFA (VENTOLIN HFA) 108 (90 BASE) MCG/ACT INHALER    Inhale 2 puffs into the lungs 4 times daily as needed for Wheezing    BENZOCAINE-MENTHOL (CEPACOL) 15-2.3 MG LOZG    Take 1 lozenge by mouth every 2 hours as needed (sore throat)    CETIRIZINE (ZYRTEC) 10 MG TABLET    Take 1 tablet by mouth daily    FERROUS SULFATE 300 (60 FE) MG/5ML SYRUP    Take 5 mLs by mouth daily    HYDROXYZINE (ATARAX) 25 MG TABLET    Take 25 mg by mouth 3 times daily as needed    IBUPROFEN (ADVIL;MOTRIN) 800 MG TABLET    Take 1 tablet by mouth every 8 hours as needed for Pain    SERTRALINE HCL (ZOLOFT PO)    Take by mouth         Review of Systems:  (2-9 systems needed)  Review of Systems  Positive history as above no fevers or chills cough congestion headache vision change neck pain or stiffness shortness of breath or chest pain or abdominal pain. No back pain or extremity pain or cramping or deformity or acute loss range of motion or strength or sensation. No generalized rash. No pelvic discharge. \"Positives and Pertinent negatives as per HPI\"    Physical Exam:  Physical Exam  Vitals and nursing note reviewed. Constitutional:       Appearance: Normal appearance. She is not diaphoretic. HENT:      Head: Normocephalic and atraumatic. Right Ear: External ear normal.      Left Ear: External ear normal.      Nose: Nose normal.      Mouth/Throat:      Mouth: Mucous membranes are moist.      Comments: Gag reflex intact  Eyes:      General:         Right eye: No discharge. Left eye: No discharge.       Conjunctiva/sclera: Conjunctivae normal.   Cardiovascular: Rate and Rhythm: Normal rate and regular rhythm. Pulses: Normal pulses. Heart sounds: Normal heart sounds. No murmur heard. No gallop. Pulmonary:      Effort: Pulmonary effort is normal. No respiratory distress. Breath sounds: Normal breath sounds. No wheezing, rhonchi or rales. Abdominal:      General: Bowel sounds are normal. There is no distension. Palpations: Abdomen is soft. Tenderness: There is no abdominal tenderness. There is no right CVA tenderness, left CVA tenderness, guarding or rebound. Genitourinary:     Comments: Patient defers this indicating that she we will provide self swabs for wet prep and GC/committee. No active rectal bleeding at this time per patient. Musculoskeletal:         General: No swelling, tenderness, deformity or signs of injury. Normal range of motion. Cervical back: Normal range of motion and neck supple. No rigidity. Lymphadenopathy:      Cervical: No cervical adenopathy. Skin:     General: Skin is warm and dry. Capillary Refill: Capillary refill takes less than 2 seconds. Findings: No rash. Neurological:      Mental Status: She is alert and oriented to person, place, and time. Mental status is at baseline.       Coordination: Coordination normal.      Gait: Gait normal.   Psychiatric:         Mood and Affect: Mood normal.         Behavior: Behavior normal.         MEDICAL DECISION MAKING    Vitals:    Vitals:    06/11/22 1442   BP: (!) 140/82   Pulse: 81   Resp: 16   Temp: 97 °F (36.1 °C)   TempSrc: Oral   SpO2: 98%   Weight: 252 lb (114.3 kg)   Height: 5' 5\" (1.651 m)       LABS:  Labs Reviewed   URINALYSIS WITH REFLEX TO CULTURE - Abnormal; Notable for the following components:       Result Value    Clarity, UA CLOUDY (*)     All other components within normal limits   MICROSCOPIC URINALYSIS - Abnormal; Notable for the following components:    Epithelial Cells, UA 6-10 (*)     Bacteria, UA Rare (*)     All other components within normal limits   WET PREP, GENITAL   C.TRACHOMATIS N.GONORRHOEAE DNA   PREGNANCY, URINE          RADIOLOGY:   Non-plain film images such as CT, Ultrasound and MRI are read by the radiologist. Raulito Duran PA-C have directly visualized the radiologic plain film image(s) with the below findings:      Interpretation per the Radiologist below, if available at the time of this note:    No orders to display        No results found. MEDICAL DECISION MAKING / ED COURSE:      PROCEDURES:   Procedures    None    Patient was given:  Medications - No data to display    This patient presents as above and evaluation and treatment is begun here. Urinalysis obtained as well as urine pregnancy and wet prep. These returned negative as above. GC/chlamydia test not returned at this time. Patient signs and symptoms are discussed with her as well as the results obtained so far to this point. She agrees that we will put off any antibiotic treatment for potential culture results. However at this time she has no fever or tachycardia, no breathing issue or acute BP change. Abdomen soft benign nonsurgical with no indication of likely bowel obstruction, mesenteric ischemia, or other emergent issue at this time. No indication of pyonephritis or widespread infection or acute system compromise. She does have signs and symptoms consistent with constipation and mild dysuria. We will write for medications to help out with constipation and recommend conservative home care and close outpatient follow-up. She verbalizes understanding and agreement with the above and the following discharge home plan. Home in good condition to drink lots of water, use medications as written, and decrease exposure to bladder irritants such as caffeine, cola, citrus, and chocolate. Monitor for gradual improvement. GC and Chlamydia tests are not yet back but we will call you if they are positive to recommend further care.   Follow-up outpatient with your family doctor in 3 days for recheck and further care as needed. Return to the emergency department for any emergency worsening or concern. The patient tolerated their visit well. I evaluated the patient. The physician was available for consultation as needed. The patient and / or the family were informed of the results of any tests, a time was given to answer questions, a plan was proposed and they agreed with plan. CLINICAL IMPRESSION:  1. Constipation, unspecified constipation type    2. Dysuria        DISPOSITION Decision To Discharge 06/11/2022 03:38:56 PM      PATIENT REFERRED TO:  [facility]    Schedule an appointment as soon as possible for a visit         DISCHARGE MEDICATIONS:  New Prescriptions    DOCUSATE SODIUM (COLACE) 100 MG CAPSULE    Take 1 capsule by mouth 2 times daily for 15 days    MAGNESIUM CITRATE 1.745 GM/30ML SOLUTION    Take 150 mL by mouth and await bowel movement. If no significant action over the course of 8 hours, take the remaining medication as a second dosage.        DISCONTINUED MEDICATIONS:  Discontinued Medications    No medications on file              (Please note the MDM and HPI sections of this note were completed with a voice recognition program.  Efforts were made to edit the dictations but occasionally words are mis-transcribed.)    Electronically signed, Kane Angela PA-C,          Kane Angela PA-C  06/11/22 0970

## 2022-06-14 LAB
C TRACH DNA GENITAL QL NAA+PROBE: NEGATIVE
N. GONORRHOEAE DNA: NEGATIVE

## 2022-11-10 ENCOUNTER — HOSPITAL ENCOUNTER (EMERGENCY)
Age: 42
Discharge: HOME | End: 2022-11-11
Attending: STUDENT IN AN ORGANIZED HEALTH CARE EDUCATION/TRAINING PROGRAM
Payer: MEDICAID

## 2022-11-10 DIAGNOSIS — N39.0 UTI (URINARY TRACT INFECTION), UNCOMPLICATED: Primary | ICD-10-CM

## 2022-11-10 LAB
BACTERIA: ABNORMAL /HPF
BILIRUBIN URINE: NEGATIVE
BLOOD, URINE: ABNORMAL
CLARITY: CLEAR
COLOR: YELLOW
EPITHELIAL CELLS, UA: ABNORMAL /HPF (ref 0–5)
GLUCOSE URINE: NEGATIVE MG/DL
HCG(URINE) PREGNANCY TEST: NEGATIVE
KETONES, URINE: ABNORMAL MG/DL
LEUKOCYTE ESTERASE, URINE: ABNORMAL
MICROSCOPIC EXAMINATION: YES
NITRITE, URINE: POSITIVE
PH UA: 6 (ref 5–8)
PROTEIN UA: NEGATIVE MG/DL
RBC UA: ABNORMAL /HPF (ref 0–4)
SPECIFIC GRAVITY UA: >=1.03 (ref 1–1.03)
URINE REFLEX TO CULTURE: YES
URINE TYPE: ABNORMAL
UROBILINOGEN, URINE: 0.2 E.U./DL
WBC UA: ABNORMAL /HPF (ref 0–5)

## 2022-11-10 PROCEDURE — 87077 CULTURE AEROBIC IDENTIFY: CPT

## 2022-11-10 PROCEDURE — 84703 CHORIONIC GONADOTROPIN ASSAY: CPT

## 2022-11-10 PROCEDURE — 87086 URINE CULTURE/COLONY COUNT: CPT

## 2022-11-10 PROCEDURE — 81001 URINALYSIS AUTO W/SCOPE: CPT

## 2022-11-10 PROCEDURE — 99283 EMERGENCY DEPT VISIT LOW MDM: CPT

## 2022-11-10 PROCEDURE — 87186 SC STD MICRODIL/AGAR DIL: CPT

## 2022-11-10 PROCEDURE — 6370000000 HC RX 637 (ALT 250 FOR IP): Performed by: STUDENT IN AN ORGANIZED HEALTH CARE EDUCATION/TRAINING PROGRAM

## 2022-11-10 RX ORDER — CEFUROXIME AXETIL 250 MG/1
250 TABLET ORAL 2 TIMES DAILY
Qty: 14 TABLET | Refills: 0 | Status: SHIPPED | OUTPATIENT
Start: 2022-11-10 | End: 2022-11-17

## 2022-11-10 RX ORDER — ONDANSETRON 4 MG/1
4 TABLET, ORALLY DISINTEGRATING ORAL EVERY 8 HOURS PRN
Qty: 20 TABLET | Refills: 0 | Status: SHIPPED | OUTPATIENT
Start: 2022-11-10

## 2022-11-10 RX ORDER — CEFUROXIME AXETIL 250 MG/1
500 TABLET ORAL ONCE
Status: COMPLETED | OUTPATIENT
Start: 2022-11-10 | End: 2022-11-10

## 2022-11-10 RX ADMIN — CEFUROXIME AXETIL 500 MG: 250 TABLET ORAL at 23:34

## 2022-11-10 ASSESSMENT — PAIN - FUNCTIONAL ASSESSMENT
PAIN_FUNCTIONAL_ASSESSMENT: ACTIVITIES ARE NOT PREVENTED
PAIN_FUNCTIONAL_ASSESSMENT: 0-10

## 2022-11-10 ASSESSMENT — PAIN DESCRIPTION - ONSET: ONSET: ON-GOING

## 2022-11-10 ASSESSMENT — PAIN DESCRIPTION - LOCATION: LOCATION: ABDOMEN

## 2022-11-10 ASSESSMENT — PAIN SCALES - GENERAL: PAINLEVEL_OUTOF10: 8

## 2022-11-10 ASSESSMENT — PAIN DESCRIPTION - ORIENTATION: ORIENTATION: LOWER

## 2022-11-10 ASSESSMENT — PAIN DESCRIPTION - DESCRIPTORS: DESCRIPTORS: DISCOMFORT

## 2022-11-10 ASSESSMENT — PAIN DESCRIPTION - PAIN TYPE: TYPE: ACUTE PAIN

## 2022-11-10 ASSESSMENT — PAIN DESCRIPTION - FREQUENCY: FREQUENCY: INTERMITTENT

## 2022-11-11 VITALS
RESPIRATION RATE: 16 BRPM | SYSTOLIC BLOOD PRESSURE: 169 MMHG | TEMPERATURE: 98.4 F | WEIGHT: 234.35 LBS | HEIGHT: 65 IN | BODY MASS INDEX: 39.04 KG/M2 | HEART RATE: 90 BPM | OXYGEN SATURATION: 98 % | DIASTOLIC BLOOD PRESSURE: 93 MMHG

## 2022-11-11 ASSESSMENT — PAIN DESCRIPTION - DESCRIPTORS: DESCRIPTORS: DISCOMFORT

## 2022-11-11 ASSESSMENT — ENCOUNTER SYMPTOMS
DIARRHEA: 0
SORE THROAT: 0
ABDOMINAL PAIN: 1
NAUSEA: 1
BACK PAIN: 0
EYE PAIN: 0
COUGH: 0
VOMITING: 0
SHORTNESS OF BREATH: 0

## 2022-11-11 ASSESSMENT — PAIN DESCRIPTION - LOCATION: LOCATION: ABDOMEN

## 2022-11-11 ASSESSMENT — PAIN - FUNCTIONAL ASSESSMENT
PAIN_FUNCTIONAL_ASSESSMENT: 0-10
PAIN_FUNCTIONAL_ASSESSMENT: ACTIVITIES ARE NOT PREVENTED

## 2022-11-11 ASSESSMENT — PAIN DESCRIPTION - ONSET: ONSET: ON-GOING

## 2022-11-11 ASSESSMENT — PAIN SCALES - GENERAL: PAINLEVEL_OUTOF10: 8

## 2022-11-11 ASSESSMENT — PAIN DESCRIPTION - PAIN TYPE: TYPE: ACUTE PAIN

## 2022-11-11 NOTE — ED PROVIDER NOTES
[address] ENCOUNTER      Pt Name: Jacklyn Lovelace  MRN: [id number]  [place] 1980  Date of evaluation: 11/10/2022  Provider: Caren Del Valle MD    CHIEF COMPLAINT       Chief Complaint   Patient presents with    Dysuria   Dysuria  HISTORY OF PRESENT ILLNESS   (Location/Symptom, Timing/Onset,Context/Setting, Quality, Duration, Modifying Factors, Severity)  Note limiting factors. Jacklyn Lovelace is a 38 y.o. female who presents to the ED with a chief complaint of dysuria x 1 week. Described as discomfort after she urinates, associated with increased urinary frequency, nausea that is no longer present. She states that she has had bilateral lower abdominal pressure as well over the past week. Denies fevers, chills, vomiting, diarrhea, vaginal pain, discharge or bleeding. Symptoms not otherwise alleviated or exacerbated by other factors. NursingNotes were reviewed. REVIEW OF SYSTEMS    (2-9 systems for level 4, 10 or more for level 5)     Review of Systems   Constitutional:  Negative for chills and fever. HENT:  Negative for congestion and sore throat. Eyes:  Negative for pain and visual disturbance. Respiratory:  Negative for cough and shortness of breath. Cardiovascular:  Negative for chest pain and palpitations. Gastrointestinal:  Positive for abdominal pain and nausea. Negative for diarrhea and vomiting. Genitourinary:  Positive for dysuria, frequency and urgency. Negative for dyspareunia, genital sores, vaginal bleeding, vaginal discharge and vaginal pain. Musculoskeletal:  Negative for back pain and neck pain. Skin:  Negative for rash and wound. Neurological:  Negative for dizziness, weakness and light-headedness.       PAST MEDICAL HISTORY     Past Medical History:   Diagnosis Date    Anxiety     Asthma          SURGICALHISTORY       Past Surgical History:   Procedure Laterality Date     SECTION           CURRENT MEDICATIONS Previous Medications    ACETAMINOPHEN (AMINOFEN) 325 MG TABLET    Take 2 tablets by mouth every 6 hours as needed for Pain    ALBUTEROL (PROVENTIL HFA;VENTOLIN HFA) 108 (90 BASE) MCG/ACT INHALER    Inhale 2 puffs into the lungs every 6 hours as needed for Wheezing. ALBUTEROL SULFATE HFA (PROVENTIL HFA) 108 (90 BASE) MCG/ACT INHALER    Inhale 2 puffs into the lungs every 4 hours as needed for Wheezing or Shortness of Breath (Space out to every 6 hours as symptoms improve) Space out to every 6 hours as symptoms improve. ALBUTEROL SULFATE HFA (VENTOLIN HFA) 108 (90 BASE) MCG/ACT INHALER    Inhale 2 puffs into the lungs 4 times daily as needed for Wheezing    BENZOCAINE-MENTHOL (CEPACOL) 15-2.3 MG LOZG    Take 1 lozenge by mouth every 2 hours as needed (sore throat)    CETIRIZINE (ZYRTEC) 10 MG TABLET    Take 1 tablet by mouth daily    FERROUS SULFATE 300 (60 FE) MG/5ML SYRUP    Take 5 mLs by mouth daily    HYDROXYZINE (ATARAX) 25 MG TABLET    Take 25 mg by mouth 3 times daily as needed    IBUPROFEN (ADVIL;MOTRIN) 800 MG TABLET    Take 1 tablet by mouth every 8 hours as needed for Pain    MAGNESIUM CITRATE 1.745 GM/30ML SOLUTION    Take 150 mL by mouth and await bowel movement. If no significant action over the course of 8 hours, take the remaining medication as a second dosage. SERTRALINE HCL (ZOLOFT PO)    Take by mouth       ALLERGIES     Penicillins    FAMILY HISTORY     History reviewed. No pertinent family history.        SOCIAL HISTORY       Social History     Socioeconomic History    Marital status: Single     Spouse name: None    Number of children: None    Years of education: None    Highest education level: None   Tobacco Use    Smoking status: Every Day     Packs/day: 3.00     Types: Cigars, Cigarettes    Smokeless tobacco: Current    Tobacco comments:     2 black and milds a day   Vaping Use    Vaping Use: Never used   Substance and Sexual Activity    Alcohol use: No    Drug use: Never Sexual activity: Not Currently       SCREENINGS             PHYSICAL EXAM    (up to 7 for level 4, 8 or more for level 5)     ED Triage Vitals [11/10/22 2251]   BP Temp Temp Source Heart Rate Resp SpO2 Height Weight   (!) 169/93 98.4 °F (36.9 °C) Oral 90 16 -- -- --       General: Alert and oriented appropriately for age, No acute distress. Eye: Normal conjunctiva. Sclera anicteric. HENT: Oral mucosa is moist.  Respiratory: Respirations even and non-labored. Cardiovascular: Normal rate, Regular rhythm. Intact peripheral pulses. Gastrointestinal: Soft, mild suprapubic tenderness, Non-distended. : deferred. Musculoskeletal: No swelling. Integumentary: Warm, Dry. Neurologic: Alert and appropriate for age. No focal deficits. Psychiatric: Cooperative. DIAGNOSTIC RESULTS       LABS:  Labs Reviewed   URINALYSIS WITH REFLEX TO CULTURE - Abnormal; Notable for the following components:       Result Value    Ketones, Urine TRACE (*)     Blood, Urine MODERATE (*)     Nitrite, Urine POSITIVE (*)     Leukocyte Esterase, Urine TRACE (*)     All other components within normal limits   MICROSCOPIC URINALYSIS - Abnormal; Notable for the following components:    WBC, UA 21-50 (*)     Epithelial Cells, UA 6-10 (*)     Bacteria, UA 4+ (*)     All other components within normal limits   CULTURE, URINE   PREGNANCY, URINE       All other labs were within normal range or not returned as of this dictation. EMERGENCY DEPARTMENT COURSE and DIFFERENTIAL DIAGNOSIS/MDM:   Vitals:    Vitals:    11/10/22 2251   BP: (!) 169/93   Pulse: 90   Temp: 98.4 °F (36.9 °C)   TempSrc: Oral         Medical decision making:  17-year-old female who presents with dysuria, lower abdominal pain, she is hemodynamically stable, afebrile, no evidence of distress, no significant tenderness on exam, does have mild suprapubic tenderness, in keeping with suspected cystitis. She is found to have UA evidence of urinary tract infection.   Given first dose antibiotics in the emergency department at her request.  She is not currently nauseous. Able to tolerate p.o. Rx Ceftin, Zofran as needed. Given outpatient follow-up instructions, discharge instructions, return precautions, she voiced understanding. Discharged home, departed the ED ambulatory in stable condition.     Medications   cefUROXime (CEFTIN) tablet 500 mg (500 mg Oral Given 11/10/22 8697)             FINAL IMPRESSION      1. UTI (urinary tract infection), uncomplicated          DISPOSITION/PLAN   DISPOSITION Decision To Discharge 11/10/2022 11:25:44 PM      PATIENT REFERRED TO:  [address] [place] [address]    In 4 days      [place] [id number]  [phone]    If symptoms worsen      DISCHARGE MEDICATIONS:  New Prescriptions    CEFUROXIME (CEFTIN) 250 MG TABLET    Take 1 tablet by mouth 2 times daily for 7 days    ONDANSETRON (ZOFRAN ODT) 4 MG DISINTEGRATING TABLET    Take 1 tablet by mouth every 8 hours as needed for Nausea          (Please note that portions of this note were completed with a voice recognition program.Efforts were made to edit the dictations but occasionally words are mis-transcribed.)    Catrachita Macario MD (electronically signed)  Attending Emergency Physician          Catrachita Macario MD  11/11/22 2042

## 2022-11-13 LAB
ORGANISM: ABNORMAL
URINE CULTURE, ROUTINE: ABNORMAL

## 2023-02-21 ENCOUNTER — HOSPITAL ENCOUNTER (EMERGENCY)
Age: 43
Discharge: HOME | End: 2023-02-22
Attending: EMERGENCY MEDICINE
Payer: MEDICAID

## 2023-02-21 VITALS
HEIGHT: 62 IN | OXYGEN SATURATION: 100 % | SYSTOLIC BLOOD PRESSURE: 144 MMHG | HEART RATE: 82 BPM | RESPIRATION RATE: 16 BRPM | TEMPERATURE: 98.2 F | BODY MASS INDEX: 43.41 KG/M2 | DIASTOLIC BLOOD PRESSURE: 102 MMHG | WEIGHT: 235.89 LBS

## 2023-02-21 DIAGNOSIS — N39.0 URINARY TRACT INFECTION WITHOUT HEMATURIA, SITE UNSPECIFIED: ICD-10-CM

## 2023-02-21 DIAGNOSIS — R10.9 ABDOMINAL PAIN, UNSPECIFIED ABDOMINAL LOCATION: Primary | ICD-10-CM

## 2023-02-21 LAB
BACTERIA: ABNORMAL /HPF
BILIRUBIN URINE: NEGATIVE
BLOOD, URINE: ABNORMAL
CLARITY: CLEAR
COLOR: YELLOW
EPITHELIAL CELLS, UA: ABNORMAL /HPF (ref 0–5)
GLUCOSE URINE: NEGATIVE MG/DL
HCG(URINE) PREGNANCY TEST: NEGATIVE
KETONES, URINE: NEGATIVE MG/DL
LEUKOCYTE ESTERASE, URINE: ABNORMAL
MICROSCOPIC EXAMINATION: YES
MUCUS: ABNORMAL /LPF
NITRITE, URINE: NEGATIVE
PH UA: 7.5 (ref 5–8)
PROTEIN UA: 30 MG/DL
RBC UA: ABNORMAL /HPF (ref 0–4)
SPECIFIC GRAVITY UA: 1.01 (ref 1–1.03)
URINE REFLEX TO CULTURE: YES
URINE TYPE: ABNORMAL
UROBILINOGEN, URINE: 1 E.U./DL
WBC UA: ABNORMAL /HPF (ref 0–5)

## 2023-02-21 PROCEDURE — 84703 CHORIONIC GONADOTROPIN ASSAY: CPT

## 2023-02-21 PROCEDURE — 99283 EMERGENCY DEPT VISIT LOW MDM: CPT

## 2023-02-21 PROCEDURE — 87086 URINE CULTURE/COLONY COUNT: CPT

## 2023-02-21 PROCEDURE — 81001 URINALYSIS AUTO W/SCOPE: CPT

## 2023-02-21 PROCEDURE — 87186 SC STD MICRODIL/AGAR DIL: CPT

## 2023-02-21 PROCEDURE — 87088 URINE BACTERIA CULTURE: CPT

## 2023-02-21 ASSESSMENT — PAIN DESCRIPTION - ORIENTATION: ORIENTATION: LOWER

## 2023-02-21 ASSESSMENT — PAIN - FUNCTIONAL ASSESSMENT
PAIN_FUNCTIONAL_ASSESSMENT: 0-10
PAIN_FUNCTIONAL_ASSESSMENT: ACTIVITIES ARE NOT PREVENTED

## 2023-02-21 ASSESSMENT — PAIN DESCRIPTION - DESCRIPTORS: DESCRIPTORS: DISCOMFORT

## 2023-02-21 ASSESSMENT — PAIN SCALES - GENERAL: PAINLEVEL_OUTOF10: 7

## 2023-02-21 ASSESSMENT — PAIN DESCRIPTION - ONSET: ONSET: ON-GOING

## 2023-02-21 ASSESSMENT — PAIN DESCRIPTION - PAIN TYPE: TYPE: ACUTE PAIN

## 2023-02-21 ASSESSMENT — PAIN DESCRIPTION - LOCATION: LOCATION: ABDOMEN

## 2023-02-22 PROCEDURE — 6370000000 HC RX 637 (ALT 250 FOR IP): Performed by: EMERGENCY MEDICINE

## 2023-02-22 RX ORDER — CEFUROXIME AXETIL 250 MG/1
500 TABLET ORAL ONCE
Status: COMPLETED | OUTPATIENT
Start: 2023-02-22 | End: 2023-02-22

## 2023-02-22 RX ORDER — CEFDINIR 300 MG/1
300 CAPSULE ORAL 2 TIMES DAILY
Qty: 14 CAPSULE | Refills: 0 | Status: SHIPPED | OUTPATIENT
Start: 2023-02-22 | End: 2023-03-01

## 2023-02-22 RX ORDER — IBUPROFEN 800 MG/1
800 TABLET ORAL EVERY 8 HOURS PRN
Qty: 20 TABLET | Refills: 0 | Status: SHIPPED | OUTPATIENT
Start: 2023-02-22 | End: 2023-03-04

## 2023-02-22 RX ADMIN — CEFUROXIME AXETIL 500 MG: 250 TABLET ORAL at 00:46

## 2023-02-22 ASSESSMENT — PAIN DESCRIPTION - DESCRIPTORS: DESCRIPTORS: DISCOMFORT

## 2023-02-22 ASSESSMENT — PAIN - FUNCTIONAL ASSESSMENT
PAIN_FUNCTIONAL_ASSESSMENT: 0-10
PAIN_FUNCTIONAL_ASSESSMENT: ACTIVITIES ARE NOT PREVENTED

## 2023-02-22 ASSESSMENT — PAIN DESCRIPTION - LOCATION: LOCATION: ABDOMEN

## 2023-02-22 ASSESSMENT — PAIN DESCRIPTION - ONSET: ONSET: ON-GOING

## 2023-02-22 ASSESSMENT — PAIN DESCRIPTION - PAIN TYPE: TYPE: ACUTE PAIN

## 2023-02-22 ASSESSMENT — PAIN DESCRIPTION - ORIENTATION: ORIENTATION: LOWER

## 2023-02-22 ASSESSMENT — PAIN SCALES - GENERAL: PAINLEVEL_OUTOF10: 7

## 2023-02-22 NOTE — ED PROVIDER NOTES
[facility] EMERGENCY DEPT VISIT      Patient Identification  Jacklyn Lovelace is a 38 y.o. female. Chief Complaint   Dysuria (Pt c/o right sided low abdominal pain. She believes she has an UTI. No nausea, vomiting or diarrhea. Last ate 11 AM, LBM 2 days ago)      History of Present Illness:    History was obtained from patient. This is a  38 y.o. female who presents ambulatory  to the ED with complaints of 1 day history of abdominal pain. Patient points to the suprapubic area and states that it radiates to the left side (contrary to triage chief complaint note patient tells me it is worse on the left) patient denies fever. No nausea or vomiting. No diarrhea. No abnormal vaginal discharge. She states it hurts more to urinate and she has been having frequency and urgency but no gross hematuria and no distinct dysuria. Patient took Tylenol with some relief. She believes it feels similar to when she had UTI in the past..     Past Medical History:   Diagnosis Date    Anxiety     Asthma        Past Surgical History:   Procedure Laterality Date     SECTION         No current facility-administered medications for this encounter.     Current Outpatient Medications:     cefdinir (OMNICEF) 300 MG capsule, Take 1 capsule by mouth 2 times daily for 7 days, Disp: 14 capsule, Rfl: 0    ibuprofen (IBU) 800 MG tablet, Take 1 tablet by mouth every 8 hours as needed for Pain, Disp: 20 tablet, Rfl: 0    ibuprofen (ADVIL;MOTRIN) 800 MG tablet, Take 1 tablet by mouth every 8 hours as needed for Pain, Disp: 20 tablet, Rfl: 0    acetaminophen (AMINOFEN) 325 MG tablet, Take 2 tablets by mouth every 6 hours as needed for Pain, Disp: 30 tablet, Rfl: 0    hydrOXYzine (ATARAX) 25 MG tablet, Take 25 mg by mouth 3 times daily as needed, Disp: , Rfl:     cetirizine (ZYRTEC) 10 MG tablet, Take 1 tablet by mouth daily, Disp: 30 tablet, Rfl: 0    ferrous sulfate 300 (60 Fe) MG/5ML syrup, Take 5 mLs by mouth daily, Disp: 300 mL, Rfl: 3    Allergies   Allergen Reactions    Penicillins Hives       Social History     Socioeconomic History    Marital status: Single     Spouse name: Not on file    Number of children: Not on file    Years of education: Not on file    Highest education level: Not on file   Occupational History    Not on file   Tobacco Use    Smoking status: Every Day     Packs/day: 3.00     Types: Cigars, Cigarettes    Smokeless tobacco: Current    Tobacco comments:     2 black and milds a day   Vaping Use    Vaping Use: Never used   Substance and Sexual Activity    Alcohol use: No    Drug use: Never    Sexual activity: Not Currently   Other Topics Concern    Not on file   Social History Narrative    Not on file     Social Determinants of Health     Financial Resource Strain: Not on file   Food Insecurity: Not on file   Transportation Needs: Not on file   Physical Activity: Not on file   Stress: Not on file   Social Connections: Not on file   Intimate Partner Violence: Not on file   Housing Stability: Not on file       Nursing Notes Reviewed        PHYSICAL EXAM:  GENERAL APPEARANCE: Nakia Faustin is in no acute respiratory distress. Awake and alert. VITAL SIGNS:   ED Triage Vitals [02/21/23 2240]   Enc Vitals Group      BP (!) 144/102      Heart Rate 82      Resp 16      Temp 98.2 °F (36.8 °C)      Temp Source Oral      SpO2 100 %      Weight 235 lb 14.3 oz (107 kg)      Height 5' 2\" (1.575 m)      Head Circumference       Peak Flow       Pain Score       Pain Loc       Pain Edu? Excl. in [address]? HEAD: Normocephalic, atraumatic. EYES:  Extraocular muscles are intact. Conjunctivas are pink. Negative scleral icterus. ENT:  Mucous membranes are moist.  Pharynx without erythema or exudates. NECK: Nontender and supple. CHEST: Clear to auscultation bilaterally. No rales, rhonchi, or wheezing. HEART:  Regular rate and rhythm. No murmurs. Strong and equal pulses in the upper and lower extremities.    ABDOMEN: Soft, nondistended, positive bowel sounds. abdomen is nontender. No guarding. No flank tenderness  MUSCULOSKELETAL:  Active range of motion of the upper and lower extremities. No edema. NEUROLOGICAL: Awake, alert and oriented x 3. Power intact in the upper and lower extremities. DERMATOLOGIC: No petechiae, rashes, or ecchymoses. ED COURSE AND MEDICAL DECISION MAKING:      Radiology:  All plain films have been evaluated by myself. They may have been overread by radiologist as noted in chart. Other radiologic studies (i.e. CT, MRI, ultrasounds, etc ) have been interpreted by radiologist.     No orders to display       Labs:  Results for orders placed or performed during the hospital encounter of 02/21/23   Culture, Urine    Specimen: Urine, clean catch   Result Value Ref Range    Organism Escherichia coli (A)     Urine Culture, Routine >100,000 CFU/ml        Susceptibility    Escherichia coli - BACTERIAL SUSCEPTIBILITY PANEL BY JAYJAY     ampicillin >=32 Resistant mcg/mL     ampicillin-sulbactam 16 Intermediate mcg/mL     ceFAZolin* <=4 Sensitive mcg/mL      * NOTE: Cefazolin should only be used for uncomplicated UTI        for E.coli or Klebsiella pneumoniae.        cefepime <=0.12 Sensitive mcg/mL     cefTRIAXone <=0.25 Sensitive mcg/mL     ciprofloxacin <=0.25 Sensitive mcg/mL     ertapenem <=0.12 Sensitive mcg/mL     gentamicin >=16 Resistant mcg/mL     levofloxacin <=0.12 Sensitive mcg/mL     nitrofurantoin <=16 Sensitive mcg/mL     piperacillin-tazobactam <=4 Sensitive mcg/mL     tobramycin 8 Intermediate mcg/mL     trimethoprim-sulfamethoxazole >=320 Resistant mcg/mL   Urinalysis with Reflex to Culture    Specimen: Urine   Result Value Ref Range    Color, UA Yellow Straw/Yellow    Clarity, UA Clear Clear    Glucose, Ur Negative Negative mg/dL    Bilirubin Urine Negative Negative    Ketones, Urine Negative Negative mg/dL    Specific Gravity, UA 1.010 1.005 - 1.030    Blood, Urine LARGE (A) Negative    pH, UA 7.5 5.0 - 8.0    Protein, UA 30 (A) Negative mg/dL    Urobilinogen, Urine 1.0 <2.0 E.U./dL    Nitrite, Urine Negative Negative    Leukocyte Esterase, Urine MODERATE (A) Negative    Microscopic Examination YES     Urine Type NotGiven     Urine Reflex to Culture Yes    Pregnancy, Urine   Result Value Ref Range    HCG(Urine) Pregnancy Test Negative Detects HCG level >20 MIU/mL   Microscopic Urinalysis   Result Value Ref Range    Mucus, UA Rare (A) None Seen /LPF    WBC, UA 10-20 (A) 0 - 5 /HPF    RBC, UA 5-10 (A) 0 - 4 /HPF    Epithelial Cells, UA 6-10 (A) 0 - 5 /HPF    Bacteria, UA 4+ (A) None Seen /HPF       Treatment in the department:  Patient received the following while in the ED:  Medications   cefUROXime (CEFTIN) tablet 500 mg (500 mg Oral Given 2/22/23 0046)         Medical decision making and differential diagnosis:  Social determinants affecting care: none  Chronic medical conditions affecting care: none    Patient presents with suprapubic pain with urinary frequency and urgency and pressure to urinate. No fever. No flank tenderness. Benign abdominal exam. No vomiting. No RLQ tenderness to suggest appendicitis. Not pregnant. UA with pyuria and bacteruria. Culture sent. No flank pain or fever to suggest pyelonephritis. Nontoxic and does not appear uroseptic so no bloodwork felt to be necessary. No vaginal discharge to suggest PID. Exam benign and does not seem consistent with torsion so no emergent ultrasound felt to be necessary    Patient presents to ED with concerns for UTI      Patient has no fever, toxicity, severe abdominal/pelvic pain, or vomiting and furthermore is hemodynamically stable and is not pregnant. I therefore feel there is low risk for PID, UROSEPSIS, OVARIAN TORSION, ECTOPIC PREGNANCY, MISCARRIAGE, ENDOMETRITIS, OVARIAN CYST RUPTURE WITH THREATENING HEMOPERITONEUM, TUBO-OVARIAN ABSCESS, PYELONEPHRITIS,  or UROSEPSIS.  I do not feel she requires emergent ultrasound imaging at this time and is stable for discharge with outpatient followup. Clinical Impression:  1. Abdominal pain, unspecified abdominal location    2. Urinary tract infection without hematuria, site unspecified        Dispo:  Patient will be discharged  at this time. Patient was informed of this decision and agrees with plan. I have discussed lab and xray findings with patient and they understand. Questions were answered to the best of my ability. Followup:  [facility]    Schedule an appointment as soon as possible for a visit       Discharge vitals:  Blood pressure (!) 144/102, pulse 82, temperature 98.2 °F (36.8 °C), temperature source Oral, resp. rate 16, height 5' 2\" (1.575 m), weight 235 lb 14.3 oz (107 kg), SpO2 100 %. Prescriptions given:   Discharge Medication List as of 2/22/2023 12:39 AM        START taking these medications    Details   cefdinir (OMNICEF) 300 MG capsule Take 1 capsule by mouth 2 times daily for 7 days, Disp-14 capsule, R-0Normal      !! ibuprofen (IBU) 800 MG tablet Take 1 tablet by mouth every 8 hours as needed for Pain, Disp-20 tablet, R-0Normal       !! - Potential duplicate medications found. Please discuss with provider. This chart was created using dragon voice recognition software.         Vannessa Hahn MD  02/26/23 4833

## 2023-02-22 NOTE — DISCHARGE INSTRUCTIONS
Drink plenty of fluids. Return for fever, increased abdominal pain or localization to the right lower quadrant, persistent vomiting, flank pain, inability to urinate or other worsening symptoms.

## 2023-02-24 LAB
ORGANISM: ABNORMAL
URINE CULTURE, ROUTINE: ABNORMAL

## 2023-03-12 ENCOUNTER — HOSPITAL ENCOUNTER (EMERGENCY)
Age: 43
Discharge: HOME | End: 2023-03-12
Payer: MEDICAID

## 2023-03-12 VITALS
RESPIRATION RATE: 16 BRPM | WEIGHT: 237.44 LBS | DIASTOLIC BLOOD PRESSURE: 86 MMHG | HEART RATE: 83 BPM | TEMPERATURE: 98.4 F | HEIGHT: 63 IN | BODY MASS INDEX: 42.07 KG/M2 | SYSTOLIC BLOOD PRESSURE: 146 MMHG | OXYGEN SATURATION: 100 %

## 2023-03-12 DIAGNOSIS — K14.6 TONGUE PAIN: Primary | ICD-10-CM

## 2023-03-12 PROCEDURE — 99283 EMERGENCY DEPT VISIT LOW MDM: CPT

## 2023-03-12 ASSESSMENT — PAIN DESCRIPTION - DESCRIPTORS: DESCRIPTORS: BURNING

## 2023-03-12 ASSESSMENT — PAIN SCALES - GENERAL
PAINLEVEL_OUTOF10: 2
PAINLEVEL_OUTOF10: 2

## 2023-03-12 ASSESSMENT — ENCOUNTER SYMPTOMS
TROUBLE SWALLOWING: 0
SORE THROAT: 0

## 2023-03-12 ASSESSMENT — PAIN DESCRIPTION - LOCATION: LOCATION: OTHER (COMMENT)

## 2023-03-12 ASSESSMENT — PAIN DESCRIPTION - PAIN TYPE: TYPE: ACUTE PAIN
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
## 2023-03-12 NOTE — ED NOTES
Pt reports painful white patches to her tongue today. Concerned that she is having an allergic reaction to omnicef that was prescribed 2/21 (finished it in past week). Denies any throat swelling, body rash. Pain to tongue at 2.   Took ASA at 1500 today    Pt states she has had some tingling /burning to her tongue since 3/8     Cash Morris RN  03/12/23 2637

## 2023-03-20 NOTE — ED NOTES
CHENCHO has examined pt already. Discharge instructions with pt. Explained rx. Tongue pain at 2. Encouraged follow up with PCP and to return to ED as needed.        Zhao Mancia RN  03/19/23 6204

## 2023-05-21 ENCOUNTER — HOSPITAL ENCOUNTER (EMERGENCY)
Age: 43
Discharge: HOME | End: 2023-05-21
Attending: EMERGENCY MEDICINE
Payer: MEDICAID

## 2023-05-21 ENCOUNTER — APPOINTMENT (OUTPATIENT)
Dept: CT IMAGING | Age: 43
End: 2023-05-21
Payer: MEDICAID

## 2023-05-21 ENCOUNTER — APPOINTMENT (OUTPATIENT)
Dept: GENERAL RADIOLOGY | Age: 43
End: 2023-05-21
Payer: MEDICAID

## 2023-05-21 VITALS
HEIGHT: 64 IN | HEART RATE: 90 BPM | OXYGEN SATURATION: 100 % | SYSTOLIC BLOOD PRESSURE: 162 MMHG | WEIGHT: 227.74 LBS | RESPIRATION RATE: 16 BRPM | BODY MASS INDEX: 38.88 KG/M2 | DIASTOLIC BLOOD PRESSURE: 97 MMHG | TEMPERATURE: 98.6 F

## 2023-05-21 DIAGNOSIS — R10.9 ACUTE ABDOMINAL PAIN: Primary | ICD-10-CM

## 2023-05-21 DIAGNOSIS — Z76.89 ENCOUNTER FOR ASSESSMENT OF STD EXPOSURE: ICD-10-CM

## 2023-05-21 DIAGNOSIS — K59.00 CONSTIPATION, UNSPECIFIED CONSTIPATION TYPE: ICD-10-CM

## 2023-05-21 LAB
ALBUMIN SERPL-MCNC: 3.8 G/DL (ref 3.4–5)
ALBUMIN/GLOB SERPL: 0.9 {RATIO} (ref 1.1–2.2)
ALP SERPL-CCNC: 109 U/L (ref 40–129)
ALT SERPL-CCNC: 8 U/L (ref 10–40)
ANION GAP SERPL CALCULATED.3IONS-SCNC: 11 MMOL/L (ref 3–16)
AST SERPL-CCNC: 15 U/L (ref 15–37)
BACTERIA GENITAL QL WET PREP: NORMAL
BASOPHILS # BLD: 0 K/UL (ref 0–0.2)
BASOPHILS NFR BLD: 0.6 %
BILIRUB SERPL-MCNC: <0.2 MG/DL (ref 0–1)
BILIRUB UR QL STRIP.AUTO: NEGATIVE
BUN SERPL-MCNC: 6 MG/DL (ref 7–20)
CALCIUM SERPL-MCNC: 8.7 MG/DL (ref 8.3–10.6)
CHLORIDE SERPL-SCNC: 105 MMOL/L (ref 99–110)
CLARITY UR: CLEAR
CLUE CELLS SPEC QL WET PREP: NORMAL
CO2 SERPL-SCNC: 24 MMOL/L (ref 21–32)
COLOR UR: YELLOW
CREAT SERPL-MCNC: 0.7 MG/DL (ref 0.6–1.1)
DEPRECATED RDW RBC AUTO: 17.4 % (ref 12.4–15.4)
EOSINOPHIL # BLD: 0.1 K/UL (ref 0–0.6)
EOSINOPHIL NFR BLD: 1.6 %
EPI CELLS SPEC QL WET PREP: NORMAL
GFR SERPLBLD CREATININE-BSD FMLA CKD-EPI: >60 ML/MIN/{1.73_M2}
GLUCOSE SERPL-MCNC: 89 MG/DL (ref 70–99)
GLUCOSE UR STRIP.AUTO-MCNC: NEGATIVE MG/DL
HCG UR QL: NEGATIVE
HCT VFR BLD AUTO: 26.9 % (ref 36–48)
HGB BLD-MCNC: 8.5 G/DL (ref 12–16)
HGB UR QL STRIP.AUTO: NEGATIVE
HYPERCHROMIA BLD QL SMEAR: ABNORMAL
HYPOCHROMIA BLD QL SMEAR: ABNORMAL
KETONES UR STRIP.AUTO-MCNC: NEGATIVE MG/DL
LEUKOCYTE ESTERASE UR QL STRIP.AUTO: NEGATIVE
LIPASE SERPL-CCNC: 18 U/L (ref 13–60)
LYMPHOCYTES # BLD: 1.6 K/UL (ref 1–5.1)
LYMPHOCYTES NFR BLD: 19.4 %
MACROCYTES BLD QL SMEAR: ABNORMAL
MCH RBC QN AUTO: 21.2 PG (ref 26–34)
MCHC RBC AUTO-ENTMCNC: 31.4 G/DL (ref 31–36)
MCV RBC AUTO: 67.4 FL (ref 80–100)
MONOCYTES # BLD: 0.5 K/UL (ref 0–1.3)
MONOCYTES NFR BLD: 6.6 %
NEUTROPHILS # BLD: 6 K/UL (ref 1.7–7.7)
NEUTROPHILS NFR BLD: 71.8 %
NITRITE UR QL STRIP.AUTO: NEGATIVE
OVALOCYTES BLD QL SMEAR: ABNORMAL
PATH INTERP BLD-IMP: YES
PH UR STRIP.AUTO: 7.5 [PH] (ref 5–8)
PLATELET # BLD AUTO: 293 K/UL (ref 135–450)
PLATELET BLD QL SMEAR: ADEQUATE
PMV BLD AUTO: 7.9 FL (ref 5–10.5)
POTASSIUM SERPL-SCNC: 3.9 MMOL/L (ref 3.5–5.1)
PROT SERPL-MCNC: 8 G/DL (ref 6.4–8.2)
PROT UR STRIP.AUTO-MCNC: NEGATIVE MG/DL
RBC # BLD AUTO: 3.99 M/UL (ref 4–5.2)
RBC SPEC QL WET PREP: NORMAL
SLIDE REVIEW: ABNORMAL
SODIUM SERPL-SCNC: 140 MMOL/L (ref 136–145)
SP GR UR STRIP.AUTO: 1.02 (ref 1–1.03)
SPECIMEN SOURCE FLD: NORMAL
STOMATOCYTES BLD QL SMEAR: ABNORMAL
T VAGINALIS GENITAL QL WET PREP: NORMAL
UA COMPLETE W REFLEX CULTURE PNL UR: NORMAL
UA DIPSTICK W REFLEX MICRO PNL UR: NORMAL
URN SPEC COLLECT METH UR: NORMAL
UROBILINOGEN UR STRIP-ACNC: 1 E.U./DL
WBC # BLD AUTO: 8.4 K/UL (ref 4–11)
WBC SPEC QL WET PREP: NORMAL
YEAST GENITAL QL WET PREP: NORMAL

## 2023-05-21 PROCEDURE — 36415 COLL VENOUS BLD VENIPUNCTURE: CPT

## 2023-05-21 PROCEDURE — 71046 X-RAY EXAM CHEST 2 VIEWS: CPT

## 2023-05-21 PROCEDURE — 84703 CHORIONIC GONADOTROPIN ASSAY: CPT

## 2023-05-21 PROCEDURE — 81003 URINALYSIS AUTO W/O SCOPE: CPT

## 2023-05-21 PROCEDURE — 80053 COMPREHEN METABOLIC PANEL: CPT

## 2023-05-21 PROCEDURE — 83690 ASSAY OF LIPASE: CPT

## 2023-05-21 PROCEDURE — 87491 CHLMYD TRACH DNA AMP PROBE: CPT

## 2023-05-21 PROCEDURE — 87591 N.GONORRHOEAE DNA AMP PROB: CPT

## 2023-05-21 PROCEDURE — 87210 SMEAR WET MOUNT SALINE/INK: CPT

## 2023-05-21 PROCEDURE — 99284 EMERGENCY DEPT VISIT MOD MDM: CPT

## 2023-05-21 PROCEDURE — 85025 COMPLETE CBC W/AUTO DIFF WBC: CPT

## 2023-05-21 PROCEDURE — 74176 CT ABD & PELVIS W/O CONTRAST: CPT

## 2023-05-21 RX ORDER — POLYETHYLENE GLYCOL 3350 17 G/17G
17 POWDER, FOR SOLUTION ORAL DAILY
Qty: 170 G | Refills: 0 | Status: SHIPPED | OUTPATIENT
Start: 2023-05-21 | End: 2023-05-31

## 2023-05-21 RX ORDER — DOCUSATE SODIUM 100 MG/1
100 CAPSULE, LIQUID FILLED ORAL 2 TIMES DAILY
Qty: 60 CAPSULE | Refills: 0 | Status: SHIPPED | OUTPATIENT
Start: 2023-05-21

## 2023-05-21 ASSESSMENT — PAIN SCALES - GENERAL: PAINLEVEL_OUTOF10: 8

## 2023-05-21 ASSESSMENT — PAIN DESCRIPTION - ORIENTATION: ORIENTATION: RIGHT

## 2023-05-21 ASSESSMENT — PAIN - FUNCTIONAL ASSESSMENT: PAIN_FUNCTIONAL_ASSESSMENT: 0-10

## 2023-05-21 ASSESSMENT — PAIN DESCRIPTION - LOCATION: LOCATION: FLANK

## 2023-05-22 LAB
C TRACH DNA CVX QL NAA+PROBE: NEGATIVE
N GONORRHOEA DNA CERV MUCUS QL NAA+PROBE: NEGATIVE
PATH INTERP BLD-IMP: NORMAL

## 2023-06-25 ENCOUNTER — HOSPITAL ENCOUNTER (EMERGENCY)
Age: 43
Discharge: HOME | End: 2023-06-25
Attending: EMERGENCY MEDICINE
Payer: MEDICAID

## 2023-06-25 VITALS
OXYGEN SATURATION: 99 % | WEIGHT: 230.38 LBS | RESPIRATION RATE: 16 BRPM | BODY MASS INDEX: 39.33 KG/M2 | HEIGHT: 64 IN | TEMPERATURE: 99.1 F | DIASTOLIC BLOOD PRESSURE: 73 MMHG | HEART RATE: 98 BPM | SYSTOLIC BLOOD PRESSURE: 132 MMHG

## 2023-06-25 DIAGNOSIS — R39.15 URINARY URGENCY: ICD-10-CM

## 2023-06-25 DIAGNOSIS — N30.00 ACUTE CYSTITIS WITHOUT HEMATURIA: Primary | ICD-10-CM

## 2023-06-25 LAB
BACTERIA URNS QL MICRO: ABNORMAL /HPF
BILIRUB UR QL STRIP.AUTO: NEGATIVE
CLARITY UR: CLEAR
COLOR UR: YELLOW
EPI CELLS #/AREA URNS HPF: ABNORMAL /HPF (ref 0–5)
GLUCOSE UR STRIP.AUTO-MCNC: NEGATIVE MG/DL
HCG UR QL: NEGATIVE
HGB UR QL STRIP.AUTO: NEGATIVE
KETONES UR STRIP.AUTO-MCNC: NEGATIVE MG/DL
LEUKOCYTE ESTERASE UR QL STRIP.AUTO: ABNORMAL
NITRITE UR QL STRIP.AUTO: NEGATIVE
PH UR STRIP.AUTO: 6.5 [PH] (ref 5–8)
PROT UR STRIP.AUTO-MCNC: ABNORMAL MG/DL
RBC #/AREA URNS HPF: ABNORMAL /HPF (ref 0–4)
SP GR UR STRIP.AUTO: 1.02 (ref 1–1.03)
UA COMPLETE W REFLEX CULTURE PNL UR: ABNORMAL
UA DIPSTICK W REFLEX MICRO PNL UR: YES
URN SPEC COLLECT METH UR: ABNORMAL
UROBILINOGEN UR STRIP-ACNC: 1 E.U./DL
WBC #/AREA URNS HPF: ABNORMAL /HPF (ref 0–5)

## 2023-06-25 PROCEDURE — 84703 CHORIONIC GONADOTROPIN ASSAY: CPT

## 2023-06-25 PROCEDURE — 81001 URINALYSIS AUTO W/SCOPE: CPT

## 2023-06-25 PROCEDURE — 6370000000 HC RX 637 (ALT 250 FOR IP): Performed by: EMERGENCY MEDICINE

## 2023-06-25 PROCEDURE — 99283 EMERGENCY DEPT VISIT LOW MDM: CPT

## 2023-06-25 RX ORDER — CEFUROXIME AXETIL 250 MG/1
250 TABLET ORAL 2 TIMES DAILY
Qty: 14 TABLET | Refills: 0 | Status: SHIPPED | OUTPATIENT
Start: 2023-06-25 | End: 2023-07-02

## 2023-06-25 RX ORDER — CEFUROXIME AXETIL 250 MG/1
250 TABLET ORAL ONCE
Status: COMPLETED | OUTPATIENT
Start: 2023-06-25 | End: 2023-06-25

## 2023-06-25 RX ADMIN — CEFUROXIME AXETIL 250 MG: 250 TABLET ORAL at 22:28

## 2023-06-25 ASSESSMENT — ENCOUNTER SYMPTOMS
DIARRHEA: 0
NAUSEA: 0
VOMITING: 0
SHORTNESS OF BREATH: 0
CONSTIPATION: 0
ABDOMINAL PAIN: 0
SORE THROAT: 0
CHEST TIGHTNESS: 0